# Patient Record
Sex: MALE | Race: WHITE
[De-identification: names, ages, dates, MRNs, and addresses within clinical notes are randomized per-mention and may not be internally consistent; named-entity substitution may affect disease eponyms.]

---

## 2020-04-03 ENCOUNTER — HOSPITAL ENCOUNTER (INPATIENT)
Dept: HOSPITAL 75 - IRF | Age: 60
LOS: 12 days | Discharge: HOME HEALTH | DRG: 560 | End: 2020-04-15
Attending: INTERNAL MEDICINE | Admitting: INTERNAL MEDICINE
Payer: COMMERCIAL

## 2020-04-03 VITALS — WEIGHT: 203.05 LBS | HEIGHT: 65.91 IN | BODY MASS INDEX: 33.02 KG/M2

## 2020-04-03 DIAGNOSIS — Z47.1: Primary | ICD-10-CM

## 2020-04-03 DIAGNOSIS — N31.9: ICD-10-CM

## 2020-04-03 DIAGNOSIS — M62.838: ICD-10-CM

## 2020-04-03 DIAGNOSIS — K59.8: ICD-10-CM

## 2020-04-03 DIAGNOSIS — Z96.651: ICD-10-CM

## 2020-04-03 DIAGNOSIS — R29.6: ICD-10-CM

## 2020-04-03 DIAGNOSIS — K59.09: ICD-10-CM

## 2020-04-03 DIAGNOSIS — S14.109S: ICD-10-CM

## 2020-04-03 DIAGNOSIS — Z86.718: ICD-10-CM

## 2020-04-03 DIAGNOSIS — M62.561: ICD-10-CM

## 2020-04-03 DIAGNOSIS — E87.1: ICD-10-CM

## 2020-04-03 DIAGNOSIS — K21.9: ICD-10-CM

## 2020-04-03 PROCEDURE — 85025 COMPLETE CBC W/AUTO DIFF WBC: CPT

## 2020-04-03 PROCEDURE — 80053 COMPREHEN METABOLIC PANEL: CPT

## 2020-04-03 PROCEDURE — 36415 COLL VENOUS BLD VENIPUNCTURE: CPT

## 2020-04-03 PROCEDURE — 94664 DEMO&/EVAL PT USE INHALER: CPT

## 2020-04-03 PROCEDURE — 83540 ASSAY OF IRON: CPT

## 2020-04-06 VITALS — DIASTOLIC BLOOD PRESSURE: 76 MMHG | SYSTOLIC BLOOD PRESSURE: 117 MMHG

## 2020-04-06 RX ADMIN — ASPIRIN 81 MG CHEWABLE TABLET SCH MG: 81 TABLET CHEWABLE at 21:23

## 2020-04-06 RX ADMIN — ENOXAPARIN SODIUM SCH MG: 100 INJECTION SUBCUTANEOUS at 18:41

## 2020-04-06 RX ADMIN — OXYCODONE HYDROCHLORIDE AND ACETAMINOPHEN PRN TAB: 5; 325 TABLET ORAL at 21:24

## 2020-04-06 RX ADMIN — BACLOFEN SCH MG: 10 TABLET ORAL at 18:41

## 2020-04-06 RX ADMIN — DOCUSATE SODIUM AND SENNOSIDES SCH EA: 8.6; 5 TABLET, FILM COATED ORAL at 21:23

## 2020-04-06 RX ADMIN — POLYETHYLENE GLYCOL (3350) SCH GM: 17 POWDER, FOR SOLUTION ORAL at 23:30

## 2020-04-06 RX ADMIN — GABAPENTIN SCH MG: 300 CAPSULE ORAL at 21:23

## 2020-04-06 NOTE — NUR
ENTERED THE MED REC USING THE DISCHARGE ORDERS FROM [facility].

ON THE DISCHARGE ORDERS IT JUST SHOWED NEW MEDS HE HAD STARTED AT [facility]- IT DIDNT MENTION 
ANY MEDS THEY WANTED HIM TO CONTINUE AFTER DISCHARGE (MAINTENANCE MEDS ETC)

SINCE THE PT WAS NOT HERE YET I CALLED THE PATIENT WIFE (FIDELIA) AND SHE LET ME KNOW HE 
DIDNT TAKE ANY MAINTENANCE MEDICATION TO HER KNOWLEDGE. 



AFTER THE MEDS ARE CONTINUED I WILL SPEAK WITH THE PT AND UPDATE THE MED REC AND NOTES AS 
NEEDED 

-------------------------------------------------------------------------------

Addendum: 04/07/20 at 1132 by JENNIFER ROSAS CPhT

-------------------------------------------------------------------------------

SPOKE WITH PT AND WENT THRU THE EXT MED HISTORY TO COMPLETE THE MED REC



ALL MEDICATIONS THAT WERE PREVIOUSLY LISTED ON THE MED REC ARE NEW FROM [facility] AND THEY ALL 
HAVE BEEN REMOVED FROM THE MED REC: PERCOCET 5/325, GABAPENTIN 300MG, BACLOFEN 10MG & 
ASPIRIN 81MG.



THE FOLLOWS MEDS HAVE BEEN ADDED TO THE MED REC DUE TO THE PT TAKING THEM BEFORE HIS [facility] 
STAY:

MELOXICAM 15MG- 1 TAB DAILY PRN

ALEVE 220MG- 1 TAB Q 8 H PRN 



PROTONIX 40MG IS SHOWN ON THE EXT MED HISTORY BUT THE PT SAYS HE QUIT TAKING
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
## 2020-04-06 NOTE — NUR
JONI AMES admitted to room 228-1, with an admitting diagnosis of REHAB FOR RIGHT TOTAL 
KNEE REPLACEMENT , on 04/06/20 from [facility] via W/C AND PRIVATE CAR, accompanied by 
STAFF.JONI AMES introduced to surroundings, call light, bed controls, phone, TV, 
temperature control, lights, meal times, smoking policy, visitor policy, side rail policy, 
bathrooms and showers.  Patient Rights given to patient in the handbook.JONI AMES 
verbalizes understanding that [facility] is not responsible for the loss or damage to any 
personal effects or valuables that are kept in the patients posession during their 
hospitalization.  The following Patient Care Plans were discussed with the PT: Discharge 
Planning, PAIN CONTROL,TESTS AND PROCEDURES, and PT/OT THERAPY. JONI AMES verbalizes 
understanding of Interdisciplinary Patient Education. Patient and/or family were informed 
about the Rapid Response Team and its purpose.

## 2020-04-07 VITALS — SYSTOLIC BLOOD PRESSURE: 106 MMHG | DIASTOLIC BLOOD PRESSURE: 71 MMHG

## 2020-04-07 VITALS — SYSTOLIC BLOOD PRESSURE: 110 MMHG | DIASTOLIC BLOOD PRESSURE: 72 MMHG

## 2020-04-07 VITALS — DIASTOLIC BLOOD PRESSURE: 75 MMHG | SYSTOLIC BLOOD PRESSURE: 119 MMHG

## 2020-04-07 LAB
ALBUMIN SERPL-MCNC: 3.3 GM/DL (ref 3.2–4.5)
ALP SERPL-CCNC: 43 U/L (ref 40–136)
ALT SERPL-CCNC: 33 U/L (ref 0–55)
BASOPHILS # BLD AUTO: 0 10^3/UL (ref 0–0.1)
BASOPHILS NFR BLD AUTO: 0 % (ref 0–10)
BILIRUB SERPL-MCNC: 0.5 MG/DL (ref 0.1–1)
BUN/CREAT SERPL: 21
CALCIUM SERPL-MCNC: 9.1 MG/DL (ref 8.5–10.1)
CHLORIDE SERPL-SCNC: 100 MMOL/L (ref 98–107)
CO2 SERPL-SCNC: 24 MMOL/L (ref 21–32)
CREAT SERPL-MCNC: 0.76 MG/DL (ref 0.6–1.3)
EOSINOPHIL # BLD AUTO: 0.1 10^3/UL (ref 0–0.3)
EOSINOPHIL NFR BLD AUTO: 1 % (ref 0–10)
ERYTHROCYTE [DISTWIDTH] IN BLOOD BY AUTOMATED COUNT: 12.5 % (ref 10–14.5)
GFR SERPLBLD BASED ON 1.73 SQ M-ARVRAT: > 60 ML/MIN
GLUCOSE SERPL-MCNC: 97 MG/DL (ref 70–105)
HCT VFR BLD CALC: 33 % (ref 40–54)
HGB BLD-MCNC: 11 G/DL (ref 13.3–17.7)
LYMPHOCYTES # BLD AUTO: 1.3 X 10^3 (ref 1–4)
LYMPHOCYTES NFR BLD AUTO: 14 % (ref 12–44)
MANUAL DIFFERENTIAL PERFORMED BLD QL: NO
MCH RBC QN AUTO: 33 PG (ref 25–34)
MCHC RBC AUTO-ENTMCNC: 33 G/DL (ref 32–36)
MCV RBC AUTO: 99 FL (ref 80–99)
MONOCYTES # BLD AUTO: 1.1 X 10^3 (ref 0–1)
MONOCYTES NFR BLD AUTO: 12 % (ref 0–12)
NEUTROPHILS # BLD AUTO: 6.9 X 10^3 (ref 1.8–7.8)
NEUTROPHILS NFR BLD AUTO: 74 % (ref 42–75)
PLATELET # BLD: 282 10^3/UL (ref 130–400)
PMV BLD AUTO: 9.8 FL (ref 7.4–10.4)
POTASSIUM SERPL-SCNC: 4.5 MMOL/L (ref 3.6–5)
PROT SERPL-MCNC: 6.6 GM/DL (ref 6.4–8.2)
SODIUM SERPL-SCNC: 135 MMOL/L (ref 135–145)
WBC # BLD AUTO: 9.3 10^3/UL (ref 4.3–11)

## 2020-04-07 RX ADMIN — OXYCODONE HYDROCHLORIDE AND ACETAMINOPHEN PRN TAB: 5; 325 TABLET ORAL at 08:41

## 2020-04-07 RX ADMIN — POLYETHYLENE GLYCOL (3350) SCH GM: 17 POWDER, FOR SOLUTION ORAL at 21:44

## 2020-04-07 RX ADMIN — GABAPENTIN SCH MG: 300 CAPSULE ORAL at 21:43

## 2020-04-07 RX ADMIN — DOCUSATE SODIUM AND SENNOSIDES SCH EA: 8.6; 5 TABLET, FILM COATED ORAL at 08:43

## 2020-04-07 RX ADMIN — ASPIRIN 81 MG CHEWABLE TABLET SCH MG: 81 TABLET CHEWABLE at 08:40

## 2020-04-07 RX ADMIN — ENOXAPARIN SODIUM SCH MG: 100 INJECTION SUBCUTANEOUS at 18:09

## 2020-04-07 RX ADMIN — POLYETHYLENE GLYCOL (3350) SCH GM: 17 POWDER, FOR SOLUTION ORAL at 08:42

## 2020-04-07 RX ADMIN — OXYCODONE HYDROCHLORIDE AND ACETAMINOPHEN PRN TAB: 5; 325 TABLET ORAL at 21:44

## 2020-04-07 RX ADMIN — BACLOFEN SCH MG: 10 TABLET ORAL at 21:43

## 2020-04-07 RX ADMIN — DOCUSATE SODIUM AND SENNOSIDES SCH EA: 8.6; 5 TABLET, FILM COATED ORAL at 08:42

## 2020-04-07 RX ADMIN — DOCUSATE SODIUM AND SENNOSIDES SCH EA: 8.6; 5 TABLET, FILM COATED ORAL at 21:43

## 2020-04-07 RX ADMIN — BACLOFEN SCH MG: 10 TABLET ORAL at 08:40

## 2020-04-07 RX ADMIN — ASPIRIN 81 MG CHEWABLE TABLET SCH MG: 81 TABLET CHEWABLE at 21:43

## 2020-04-07 RX ADMIN — OXYCODONE HYDROCHLORIDE AND ACETAMINOPHEN PRN TAB: 5; 325 TABLET ORAL at 15:07

## 2020-04-07 RX ADMIN — BACLOFEN SCH MG: 10 TABLET ORAL at 13:27
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
## 2020-04-07 NOTE — INDIVIDUALIZED PLAN OF CARE
Individualized Plan of Care


Rehab Nursing IPOC Order


Admission Date


Apr 6, 2020 at 16:51


Current Orders





Orders


Admission Order(Inpt,Obs,Sdc) (4/3/20 10:58)


Vital Signs: Per Unit Policy ( 08,16,00 (4/3/20 10:58)


Royce Hose 09,21 (4/3/20 10:58)


Sequential Compression Device Q4H (4/3/20 10:58)


-Inpt Rehab Con (4/3/20 10:58)


Rehab Nursing Orders-Ipoc (4/3/20 10:58)


Physical Therapy Rehab Orders (4/3/20 10:58)


Occupational Therapy Rehab Ord (4/3/20 10:58)


Speech Therapy Rehab Orders (4/3/20 10:58)


General/Regular (4/3/20 Dinner)


Precautions (Aru) (4/3/20 10:58)


Rehab-Intensity Of Therapy (4/3/20 10:58)


Initiate Admission Nursing Pro .admission (4/3/20 10:58)


Alprazolam Tablet (Xanax Tablet) (4/3/20 11:00)


Calcium Carbonate Chew Tablet (Antacid C (4/3/20 11:00)


Diphenhydramine Tablet (Benadryl Tablet) (4/3/20 11:00)


Docusate Sodium Capsule (Colace Capsule) (4/3/20 21:00)


Docusate Sodium Capsule (Colace Capsule) (4/3/20 11:00)


Bisacodyl Suppository (Dulcolax Supposit (4/3/20 11:00)


Lactulose Oral Solution (Enulose Oral So (4/3/20 11:00)


Na Phos/Na Biphos Enema (Fleet Enema Kumar (4/3/20 11:00)


Guaifenesin/Codeine Syrup (Robitussin Ac (4/3/20 11:00)


Loperamide Tablet (Imodium Tablet) (4/3/20 11:00)


Enoxaparin Injection (Lovenox Injection) (4/3/20 11:00)


Melatonin  Tablet (Melatonin Tablet) (4/3/20 11:00)


Polyethylene Glycol Powder Pkt (Miralax (4/3/20 21:00)


Ondansetron  Oral Dissolve Tab (Zofran (4/3/20 11:00)


Senna S Tablet (Senokot S Tablet) (4/3/20 21:00)


Code/Resuscitation (4/3/20 10:58)


Cbc With Automated Diff (4/7/20 06:00)


Comprehensive Metabolic Panel (4/7/20 06:00)


Iron Test (Fe) (4/7/20 06:00)


Aspirin Chewable Tablet (Baby Aspirin Ch (4/6/20 21:00)


Baclofen Tablet (Lioresal Tablet) (4/6/20 21:00)


Gabapentin Capsule/Tablet (Neurontin Cap (4/6/20 21:00)


Oxycodone/Apap 5/325mg Tablet (Percocet (4/6/20 17:45)


Polyethylene Glycol Powder Pkt (Miralax (4/6/20 21:00)


Senna S Tablet (Senokot S Tablet) (4/6/20 21:00)


Ambulate 08,12,20 (4/6/20 17:45)


Sequential Compression Device Q4H (4/6/20 17:45)


Dvt/Vte Risk - Notifiy Physici Q4H (4/6/20 17:45)


Enoxaparin Injection (Lovenox Injection) (4/6/20 18:00)


Rt Request For Service (4/6/20 19:23)


Patient Visit (4/7/20 )


Speech Sound Lang Comp (4/7/20 )


Patient Visit (4/7/20 )


Pt Eval Moderate Complexity (4/7/20 )


Functional Activities, Ea 15 (4/7/20 )


Exercise Therap, Ea 15 Min (4/7/20 )


Wheelchair Mgmt/Propulsn 15min (4/7/20 )


Diphenhydramine 2% Cream (Benadryl 2% Cr (4/7/20 17:30)


Patient Visit (4/8/20 )


Exercise Therap, Ea 15 Min (4/8/20 )


Gait Training, Ea 15 Min (4/8/20 )


Functional Activities, Ea 15 (4/8/20 )


Aspirin Chewable Tablet (Baby Aspirin Ch (4/9/20 09:00)


Patient Visit (4/8/20 )


Gait Training, Ea 15 Min (4/8/20 )


Wheelchair Mgmt/Propulsn 15min (4/8/20 )


Rehab Nursing Orders:  Ongoing Assess. of Cognitive Status, Ongoing Assess. of 

Function Status, Bladder Management, Bladder Scan, Bladder Training, Bowel 

Management, Bowel Training, Disease Management & Educaiton, DVT Prophylaxis, 

Fall Prevention, Fluid/Electrolyte/Nutrition Mgmt, Infection Prevention, 

Medication Management & Education, Management of Risks & Complications, 

Management of Skin Intergrity, Nutrition Management, Pain Management, 

Patient/Family Support, Safety Management, Swallow Precautions, Weight Bearing 

Precaution





Intensity of Therapy to be met


Patient to be seen:  Min.3h per day/5 of 7d





PT IPOC


Problem List:  Activity Tolerance, Functional Strength, Safety, Balance, Gait, 

Transfer, Bed Mobility, ROM


Treatment Plan:  Continue Plan of Care


Bed Mobility, Education, Functional Activity Kai, Functional Strength, Group 

Therapy, Gait, Safety, Therapeutic Exercise, Transfers


Treatment Duration:  Apr 28, 2020


Frequency:  At least 5 of 7 days/Wk (IRF)


Estimated Hrs Per Day:  1.5 hours per day





OT IPOC


Problems:  Decreased Activ Tolerance, Dependent Transfers, Impaired Bed 

Mobility, Impaired Funct Balance, Impaired I ADL's, Impaired Self-Care Skills


OT Treatment, Training and Edu:  Yes


Plan of Care:  ADL Retraining, Functional Mobility, Group Exercise/Act as Ind, 

UE Funct Exercise/Act


Treatment Duration:  Apr 21, 2020


Frequency:  At least 5 of 7 days/Wk (IRF)


Estimated Hrs Per Day:  1.5 hours per day





ST IPOC


Speech Therapy Treatment Plan:  Discontinue ST


Treatment Duration:  Apr 7, 2020


Frequency:  1 time per week


Estimated Hrs Per Day:  .25 hour per day





/Case Mgmt


/Case Managemen:  Discharge Planning





Dietitian/Nutritionist


Dietitian/Nutritionist to monitor nutritional status and make changes and/or 

recommendations as needed and work with speech pathology on dietary upgrades as 

the occur.





Physician IPOC


Medical Issues being managed closely and that require the 24 hour availability 

of a physician:





h/p spinal cord injury previous quad from C4-C5 fracture with h/o multiple falls

with neurogenic bladder and atonic colon at high risk for recurrent DVT and 

falls


Medical Issues:  Bowel/Bladder Function, DVT Prophylaxis, Falls Precautions, 

Fluid/Electrolyte/Nutrition Balance, Infection Protection, Pain Management


Brief Synthesis of Preadmission Screen, Post-Admission Evaluation, and Therapy 

Evaluations:





PT OT will focus on regaining ADL's and stamina and ambulation and gain enough 

strength to go home with family.


Medical Prognosis:  Good


Anticipated Length of Stay:  7 days











EYAL NIELSEN DO                 Apr 7, 2020 19:43

## 2020-04-07 NOTE — NUR
RD ASSESSMENT 



PMHx: GERD; chronic constipation; s/p R TKA 



PT INTERACTION: Pt was awake and pleasant during nutrition assessment. Pt states current 
appetite is okay, but it was a little worse prior to admit. Note PO intake of 100% x1meal, 
per chart review. Pt states following a regular diet at home, and has no issues with 
chewing/swallowing food. Pt states no recent issues with nausea or vomiting. Pt states 
having chronic issues with constipation, and that his last BM was 4/6. Note pt currently on 
bowel regimen of Senna BID; and Miralax BID, per chart review. Pt states no recent wt 
changes. Note unable to determine recent wt hx, per chart review. 



ABNORMAL NUTRITION-RELATED LAB VALUES

**Labs WNL at this time



Est. kcal needs: 8380-6452 kcal | 20-25 kcal/kg  

Est. Pro needs:   g Pro | 1.0-1.2 g Pro/kg 



PES STATEMENT: Given current PO intake, no nutrition diagnosis at this time (NO-1.1)



INTERVENTION:  

Continue with current diet order of Regular diet. 

Will continue to follow and reassess as pt needs, intake, and status change. 



MONITOR/EVALUATE:  

PO Intake; Plan of Care; Hydration Status; Weight Status; Lab Values 





RENETTA Dugan, MS, RD, LD

## 2020-04-07 NOTE — OCCUPATIONAL THERAPY EVAL
OT Evaluation-General/PLF


Medical Diagnosis


Admission Date


2020 at 16:51


Medical Diagnosis:  Right knee replacement


Onset Date:  Mar 31, 2020





Therapy Diagnosis


Therapy Diagnosis:  Weakness, Decreased ADL skills





Precautions


Precautions/Isolations:  Fall Prevention, Standard Precautions, Pressure Ulcer





Weight Bear Status


Weight Bearing Restriction:  Weight Bearing/Tolerated


Pt. has extension brace on right LE for mobility and pain.





Referral


Physician:  Ashley Vogel DO


Referral Reason:  Activity Tolerance, Self Care, Evaluation/Treatment, 

Strengthening/ROM





Medical History


Pertinent Medical History:  Back Injury


Additional Medical History


C4-5 fx with fusion, spinal cord injury, spasms, neurogenic bladder, DVT


Current History


Pt. reports that his knees were causing severe pain.  Elective knee replacement 

right LE.


Reviewed History:  Yes





Social History


Home:  Single Level


Current Living Status:  Spouse (And two grown children)


Entry Into Home:  Stairs With Railing


 Steps Into Home:  3





ADL-Prior Level of Function


SCALE: Activities may be completed with or without assistive devices.





6-Indepedent-patient completes the activity by him/herself with no assistance 

from a helper.


5-Set-up or Clean-up Assistance-helper sets up or cleans up; patient completes 

activity. [place] assists only prior to or  


    following the activity.


4-Supervision or Touching Assistance-helper provides verbal cues and/or 

touching/steadying and/or contact guard assistance as patient completes 

activity. Assistance may be provided   


    throughout the activity or intermittently.


3-Partial/Moderate Assistance-helper does LESS THAN HALF the effort. [place] 

lifts, holds or supports trunk or limbs, but provides less than half the effort.


2-Substantial/Maximal Assistance-helper does MORE THAN HALF the effort. [place] 

lifts or holds trunk or limbs and provides more than half the effort.


1-Gscdspuct-ijbamd does ALL the effort. Patient does none of the effort to 

complete the activity. Or, the assistance of 2 or more helpers is required for 

the patient to complete the  


    activity.


If activity was not attempted, code reason:


7-Patient Refused.


9-Not Applicable-not attempted and the patient did not perform the activity befo

re the current illness, exacerbation or injury.


10-Not Attempted due to Environmental Limitations-(lack of equipment, weather re

straints, etc.).


88-Not Attempted due to Medical Conditions or Safety Concerns.


ADL PLOF Comments


Pt. reports that he was not using assistive device for ambulation until 

recently.  Pt. reports a small step up into shower, and a jacuzzi tub that he 

gets into, and "crawls" out of.  Pt. has modified certain tasks due to previous 

SCI.  He is continent of bowel and bladder, as long as he is able to reach 

toilet on time.  He was independent with daily skills such as 

dressing/bathing/driving.


Self Care:  Independent


Functional Cognition:  Independent


DME/Equipment:  Shower, Tub/Shower


DME/Equipment Comments


Pt. has a walker and cane, and a borrowed wheelchair.


Occupation:   at [employer]


Drive Self:  Yes





OT Current Status


Subjective


Pt. reports 6/10 pain in right knee.  Nursing gives pain medication.





Appearance


Pt. in bed when OT enters room.  Agrees to work with therapist.





Mental Status/Objective


Patient Orientation:  Person, Place, Time, Situation





Current


Glasses/Contacts:  Yes


Hand Dominance:  Right


Upper Extremity ROM


WFL


Upper Extremity Coordination


Limited coordination in right hand due to SCI in .  Pt. reports that he is 

able to use index and middle finger for typing.


Upper Extremity Sensation


Intact





ADL-Treatment


Eating (QC):  6


Oral Hygiene (QC):  5 (Set up)


Shower/Bathe Self (QC):  3 (Min assist for sponge bath only.  Will complete 

shower at next session.)


Upper Body Dressing (QC):  5


Lower Body Dressing (QC):  5 (Pt. removed shorts and donned them at bed level 

previous to OT entering room. )


On/Off Footwear (QC):  2


Toileting Hygiene (QC):  7


Pt. seen twice by OT for evaluation and treatment.  Pt. is educated on rehab 

goals and OT goals.  Verbalizes understanding.  Pt. reports that he doffed 

shorts in bed at night because he was hot.  He donned them earlier before 

therapist arrived.  At second session in a.m., pt up in wheelchair after PT.  

Completed sponge bath in chair, with exception of washing rosana area, as he had 

already completed.  OT brought in AE after session and practiced doffing/donning

slipper socks with DS and sock aide.  Pt. able to do so with SBA and cues.  Pt. 

able to self propel wheelchair to large bathroom.  OT demonstrated use of tub 

transfer bench and grab bars.  Pt. is concerned that bench will be too low for 

him.  Has brace on at this time that will make transfer into shower difficult, 

so will practice at later time when brace is able to come off during functional 

treatment.  Pt. and OT talked about his toilet at home.  He states that when he 

was having difficulty standing, he would slide to and from toilet.  Pt. 

verbalizes having a system at home that works for him.  OT will attempt to 

simulate it for increased independence at this facility.  Pt. self propelled 

back to room.  Stood from wheelchair with max assist and walker, and pivoted to 

bed.  Min assist to get right LE into bed.  All needs met.





Education


OT Patient Education:  Correct positioning, Modified ADL techniques, Progress 

toward Goal/Update tx plan, Purpose of tx/functional activities, Reviewed p

recautions, Rehab process, Transfer techniques, Use of adapted equipment, W/C 

management


Teaching Recipient:  Patient


Teaching Methods:  Demonstration, Discussion


Response to Teaching:  Verbalize Understanding, Return Demonstration





OT Short Term Goals


Short Term Goals


Time Frame:  2020


Eatin


Oral hygiene:  6


Toileting hygiene:  4


Shower/bathe self:  4


Upper body dressin


Lower body dressing:  3


Putting on/taking off footwear:  3





OT Long Term Goals


Long Term Goals


Time Frame:  2020


Eating (QC):  6


Oral Hygiene (QC):  6


Toileting Hygiene (QC):  6


Shower/Bathe Self (QC):  5


Upper Body Dressing (QC):  6


Lower Body Dressing (QC):  6


On/Off Footwear (QC):  6


Additional Goals:  1-Demonstrate ADL Tasks, 2-Verbalize Understanding, 3-

ImproveStrength/Kai


1=Demonstrate adherence to instructed precautions during ADL tasks.


2=Patient will verbalize/demonstrate understanding of assistive 

devices/modifications for ADL.


3=Patient will improve strength/tolerance for activity to enable patient to 

perform ADL's.





OT Education/Plan


Problem List/Assessment


Assessment:  Decreased Activ Tolerance, Dependent Transfers, Impaired Bed 

Mobility, Impaired Funct Balance, Impaired I ADL's, Impaired Self-Care Skills





Discharge Recommendations


Plan/Recommendations:  Continue POC


Therapy Discharge Recommendati:  Home & Family, Post Acute OT


Equpiment Recommendations-D/C:  Extended Bath Bench, Rails on Tub/Shower, Hip 

Kit


Target Placement


Home with family support.





Treatment Plan/Plan of Care


Treatment,Training & Education:  Yes


Patient would benefit from OT for education, treatment and training to promote 

independence in ADL's, mobility, safety and/or upper extremity function for 

ADL's.


Plan of Care:  ADL Retraining, Functional Mobility, Group Exercise/Act as Ind, 

UE Funct Exercise/Act


Treatment Duration:  2020


Frequency:  At least 5 of 7 days/Wk (IRF)


Estimated Hrs Per Day:  1.5 hours per day


Agreement:  Yes


Rehab Potential:  Good





Time/GCodes


Start Time:  08:25


Stop Time:  11:55


Total Time Billed (hr/min):  90


Billed Treatment Time


7877-9390 1, EVM x 15minutes, FA x 20minutes


7781-8294 1, ADL x 55minutes











LILIANA SIMPSON OT            2020 14:22

## 2020-04-07 NOTE — PHYSICAL THERAPY DAILY NOTE
PT Daily Note-Current


Subjective


Patient in bed pre tx, agrees to PT, has 6-7/10 pain, states he will take pain 

meds when done with PT.





Appearance


Patient in recliner post tx with nurse call, phone, tray, all needs met.





Mental Status


Patient Orientation:  Normal For Age





Transfers


SCALE: Activities may be completed with or without assistive devices.





6-Indepedent-patient completes the activity by him/herself with no assistance 

from a helper.


5-Set-up or Clean-up Assistance-helper sets up or cleans up; patient completes 

activity. [place] assists only prior to or  


    following the activity.


4-Supervision or Touching Assistance-helper provides verbal cues and/or 

touching/steadying and/or contact guard assistance as patient completes 

activity. Assistance may be provided   


    throughout the activity or intermittently.


3-Partial/Moderate Assistance-helper does LESS THAN HALF the effort. [place] 

lifts, holds or supports trunk or limbs, but provides less than half the effort.


2-Substantial/Maximal Assistance-helper does MORE THAN HALF the effort. [place] 

lifts or holds trunk or limbs and provides more than half the effort.


5-Aqkikfyxe-zinacl does ALL the effort. Patient does none of the effort to 

complete the activity. Or, the assistance of 2 or more helpers is required for 

the patient to complete the  


    activity.


If activity was not attempted, code reason:


7-Patient Refused.


9-Not Applicable-not attempted and the patient did not perform the activity 

before the current illness, exacerbation or injury.


10-Not Attempted due to Environmental Limitations-(lack of equipment, weather 

restraints, etc.).


88-Not Attempted due to Medical Conditions or Safety Concerns.


Roll Left & Right (QC):  3


Lying to Sitting/Side of Bed(Q:  3


Sit to Stand (QC):  3


Chair/Bed-to-Chair Xfer(QC):  3





Weight Bearing


Right Lower Extremity:  Right


Weight Bearing/Tolerated





Gait Training


Distance:  30'


Walk 10 feet (QC):  4


Gait Persons Needed:  1


Gait Assistive Device:  FWW


CGA, slides both feet across the floor, very slow, narrow ANT





Wheelchair Training


Does the Pt Use a Wheelchair?:  Yes


Wheel 50 ft with 2 turns (QC):  4


Wheel 150 ft (QC):  4


Type of Wheelchair:  Manual


150'x2





Exercises


Standing:  Hip Abduction, Heel/toe raises, 3 way Ex=Flex, Abd, Ext (only 

flexion)


Standing Reps:  10





Treatments


bed mobility and transfers, ambulation, LE exercise, WC mobility





Assessment


Current Status:  Fair Progress


improved sit to stand, still mod assist but slightly less than this morning





PT Short Term Goals


Short Term Goals


Time Frame:  2020


Roll Left & Right:  4


Sit to lyin


Lying to sitting on side of be:  3


Sit to stand:  3


Chair/bed-to-chair transfer:  4


Walk 10 feet:  4


Walk 50 feet with two turns:  4





PT Long Term Goals


Long Term Goals


PT Long Term Goals Time Frame:  2020


Roll Left & Right (QC):  4 (SBA)


Sit to Lying (QC):  4 (SBA)


Lying-Sitting on Side/Bed(QC):  4 (SBA)


Sit to Stand (QC):  3 (Era)


Chair/Bed-to-Chair Xfer(QC):  4 (SBA)


Toilet Transfer (QC):  3 (Era)


Car Transfer (QC):  3 (Era)


Does the Patient Walk:  Yes


Walk 10 feet (QC):  4 (SBA)


Walk 50ft with 2 Turns (QC):  4 (SBA)


Walk 150 ft (QC):  4 (SBA)


Walking 10ft on Uneven Surface:  88


1 Step (curb) (QC):  88


4 Steps (QC):  88


12 Steps (QC):  88


Picking up an Object (QC):  88


Does the Pt use WC or Scooter?:  Yes


Wheel 50 feet with 2 turns (QC:  6


Type:  Manual


Wheel 150 feet:  6


Type:  Manual





PT Plan


Problem List


Problem List:  Activity Tolerance, Functional Strength, Safety, Balance, Gait, 

Transfer, Bed Mobility, ROM





Treatment/Plan


Treatment Plan:  Continue Plan of Care


Treatment Plan:  Bed Mobility, Education, Functional Activity Kai, Functional 

Strength, Group Therapy, Gait, Safety, Therapeutic Exercise, Transfers


Treatment Duration:  2020


Frequency:  At least 5 of 7 days/Wk (IRF)


Estimated Hrs Per Day:  1.5 hours per day


Patient and/or Family Agrees t:  Yes





Safety Risks/Education


Patient Education:  Gait Training, Transfer Techniques, Correct Positioning, W/C

Management, Safety Issues


Teaching Recipient:  Patient


Teaching Methods:  Demonstration, Discussion


Response to Teaching:  Reinforcement Needed





Time/GCodes


Time In:  1330


Time Out:  1400


Total Billed Treatment Time:  30


Total Billed Treatment


1 visit


FA 20'


[facility] 10'











NAVJOT STEVENS PT                 2020 14:02

## 2020-04-07 NOTE — NUR
Pt reported some itchiness on his upper back, states that he noticed it a few days before he 
left [place]. Noted scattered, red, pimply like areas, notified Dr. Vogel, & rec'd new 
orders for Benadryl cream, see EMAR.

## 2020-04-07 NOTE — PHYSICAL THERAPY EVALUATION
PT Evaluation-General


Medical Diagnosis


Admission Date


2020 at 16:51


Medical Diagnosis:  Right knee replacement


Onset Date:  Mar 31, 2020





Therapy Diagnosis


Therapy Diagnosis:  impaired mobility, strength, endurance, balance, ROM





Precautions


Precautions/Isolations:  Fall Prevention, Standard Precautions, Pressure Ulcer





Weight Bear Status


Right Lower Extremity:  Right


Weight Bearing/Tolerated





Referral


Physician:  Ashley Vogel DO


Reason for Referral:  Evaluation/Treatment





Medical History


Pertinent Medical History:  Back Injury


Additional Medical History


C4-5 fx with fusion, spinal cord injury, spasms, neurogenic bladder, DVT


Reviewed History:  Yes





Social History


Home:  Single Level


Current Living Status:  Spouse (grown children at home)


Entry Into Home:  Stairs With Railing


PT Steps Into Home:  3





Prior


Prior Level of Function


SCALE: Activities may be completed with or without assistive devices.





6-Indepedent-patient completes the activity by him/herself with no assistance 

from a helper.


5-Set-up or Clean-up Assistance-helper sets up or cleans up; patient completes 

activity. [place] assists only prior to or  


    following the activity.


4-Supervision or Touching Assistance-helper provides verbal cues and/or 

touching/steadying and/or contact guard assistance as patient completes 

activity. Assistance may be provided   


    throughout the activity or intermittently.


3-Partial/Moderate Assistance-helper does LESS THAN HALF the effort. [place] 

lifts, holds or supports trunk or limbs, but provides less than half the effort.


2-Substantial/Maximal Assistance-helper does MORE THAN HALF the effort. [place] 

lifts or holds trunk or limbs and provides more than half the effort.


7-Gpiewojzp-kmvfyh does ALL the effort. Patient does none of the effort to 

complete the activity. Or, the assistance of 2 or more helpers is required for 

the patient to complete the  


    activity.


If activity was not attempted, code reason:


7-Patient Refused.


9-Not Applicable-not attempted and the patient did not perform the activity 

before the current illness, exacerbation or injury.


10-Not Attempted due to Environmental Limitations-(lack of equipment, weather 

restraints, etc.).


88-Not Attempted due to Medical Conditions or Safety Concerns.


Bed Mobility:  6


Transfers (B,C,W/C):  6


Gait:  6


Indoor Mobility (Ambulation):  Independent


Patient states he was using a SPC previously.





PT Evaluation-Current


Subjective


Patient in bed pre tx, agrees to PT, has 5/10 pain in right knee and hamstring. 

Patient has been having significant painful hamstring spasms.





Pt/Family Goals


to be independent at home





Objective


Patient Orientation:  Person, Place, Situation


right knee immobilizer for comfort and to control hamstring spasms.





ROM/Strength


ROM Lower Extremities


right knee flexion 80 degrees, extension +20 degrees





Sensory


Vision:  Wears Glasses


Hearing:  Functional


Sensation Right Lower Extremit:  Intact


Sensation Left Lower Extremity:  Intact





Transfers


Roll Left to Right (QC):  3


Sit to Lying (QC):  3


Lying to Sitting/Side of Bed(Q:  3


Sit to Stand (QC):  2


Chair/Bed-to-Chair Xfer(QC):  4


Toilet Transfer (QC):  2


Car Transfer (QC):  2


Patient performs bed mobility with min assist, supine to sit with mod assist, 

sit to supine with min assist, sit to stand max assist, transfers CGA, car 

transfer max assist.  Patient can perform sit to stand with CGA or min assist 

from a significantly elevated surface, but cannot help much from lower surfaces.





Gait


Does the Patient Walk?:  Yes


Mode of Locomotion:  Walk


Anticipated Mode of Locomotion:  Walk


Walk 10 feet (QC):  4


Walk 50 ft with 2 Turns(QC):  88


Walk 150 ft (QC):  88


Walking 10ft/uneven surface-QC:  88


Distance:  30'


Gait Assistive Device:  FWW


Comments/Gait Description


Patient ambulated 30' with a rolling walker with CGA.  He slides both feet 

across the floor, has right foot drop, bilateral flexed knees, very narrow ANT.





Wheelchair Training


Does the Pt Use a Wheelchair?:  Yes


Distance:  100'


Wheel 50 ft with 2 turns (QC):  4


Wheel 150 ft (QC):  88


Type of Wheelchair:  Manual





Stairs


1 Step (curb) (QC):  88


4 Steps (QC):  88


12 Steps (QC):  88





Balance


Sitting Static:  Fair


Sitting Dynamic:  Fair


Standing Static:  Poor


 Standing Dynamic:  Poor


Picking up an Object (QC):  88


Special Test Comments


Patient is a little light headed immediately upon sitting from laying down but 

it passes quickly, he is slightly unsteady just sitting at the edge of bed and 

needs come careful adjusting and positioning to get into a stable position.





Treatment


RLE TKA protocol (with brace off) x10 (QS, HS, SAQ, SLR), also performed RLE LAQ

for 5 min





Assessment/Needs


Patient has impaired mobility, strength, endurance, balance, ROM.   He has 

impaired sitting and standing balance and needs extra positioning to maintain 

safety.  Patient in WC at bedside post tx, has OT right after PT, has nurse 

call.


Rehab Potential:  Fair





PT Short Term Goals


Short Term Goals


Time Frame:  2020


Roll Left & Right:  4


Sit to lyin


Lying to sitting on side of be:  3


Sit to stand:  3


Chair/bed-to-chair transfer:  4


Walk 10 feet:  4


Walk 50 feet with two turns:  4





PT Long Term Goals


Long Term Goals


PT Long Term Goals Time Frame:  2020


Roll Left & Right (QC):  4 (SBA)


Sit to Lying (QC):  4 (SBA)


Lying-Sitting on Side/Bed(QC):  4 (SBA)


Sit to Stand (QC):  3 (Era)


Chair/Bed-to-Chair Xfer(QC):  4 (SBA)


Toilet Transfer (QC):  3 (Era)


Car Transfer (QC):  3 (Era)


Does the Patient Walk:  Yes


Walk 10 feet (QC):  4 (SBA)


Walk 50ft with 2 Turns (QC):  4 (SBA)


Walk 150 ft (QC):  4 (SBA)


Walking 10ft on Uneven Surface:  88


1 Step (curb) (QC):  88


4 Steps (QC):  88


12 Steps (QC):  88


Picking up an Object (QC):  88


Does the Pt use WC or Scooter?:  Yes


Wheel 50 feet with 2 turns (QC:  6


Type:  Manual


Wheel 150 feet:  6


Type:  Manual





PT Plan


Problem List


Problem List:  Activity Tolerance, Functional Strength, Safety, Balance, Gait, 

Transfer, Bed Mobility, ROM





Treatment/Plan


Treatment Plan:  Continue Plan of Care


Treatment Plan:  Bed Mobility, Education, Functional Activity Kai, Functional 

Strength, Group Therapy, Gait, Safety, Therapeutic Exercise, Transfers


Treatment Duration:  2020


Frequency:  At least 5 of 7 days/Wk (IRF)


Estimated Hrs Per Day:  1.5 hours per day


Patient and/or Family Agrees t:  Yes





Safety Risks/Education


Patient Education:  Gait Training, Transfer Techniques, Correct Positioning, W/C

Management, Safety Issues


Teaching Recipient:  Patient


Teaching Methods:  Demonstration, Discussion


Response to Teaching:  Reinforcement Needed





Discharge Recommendations


Plan


Patient will perform bed mobility and transfer training, balance and endurance 

training, functional strengthening, stair training, gait training, and 

education, to improve functional mobility and independence at home.


Therapy Discharge Recommendati:  Home & Family





Time/GCodes


Time In:  0900


Time Out:  1000


Total Billed Treatment Time:  60


Total Billed Treatment


1 visit


EVM 30'


FA 15'


EX 15'











NAVJOT STEVENS PT                 2020 11:34

## 2020-04-07 NOTE — PM&R PROGRESS NOTE
Subjective


HPI/CC On Admission


Date Seen by Provider:  Apr 7, 2020


Time Seen by Provider:  10:30


Subjective/Events-last exam


Patient doing better since bed has been changed out


Labs reviewed and all stable


DVT PPX with ASA and Lovenox since h/o DVT 1990


IT will set up his computer to help him keep up with work


Percocet and Baclofen working very well for him


Suppository available if needed since he does use those at home also


BM 4/6/20 before transported to [facility]


Checked meds and labs


Reviewed therapy notes


Conferred with RN





Review of Systems


General:  Fatigue


Musculoskeletal:  leg pain





Objective


Exam


Vital Signs





Vital Signs








  Date Time  Temp Pulse Resp B/P (MAP) Pulse Ox O2 Delivery O2 Flow Rate FiO2


 


4/7/20 16:53 36.8 79 16 106/71 (83) 96 Room Air  





Capillary Refill : Less Than 3 Seconds


General Appearance:  No Apparent Distress, WD/WN


HEENT:  PERRL/EOMI, Normal ENT Inspection, Pharynx Normal


Neck:  Full Range of Motion, Normal Inspection, Non Tender, Supple, Carotid 

Bruit


Respiratory:  Chest Non Tender, Lungs Clear, Normal Breath Sounds, No Accessory 

Muscle Use, No Respiratory Distress


Cardiovascular:  Regular Rate, Rhythm, No Edema, No Gallop, No JVD, No Murmur, 

Normal Peripheral Pulses


Gastrointestinal:  Normal Bowel Sounds, No Organomegaly, No Pulsatile Mass, Non 

Tender, Soft


Back:  Normal Inspection, No CVA Tenderness, No Vertebral Tenderness, Decreased 

Range of Motion


Extremity:  Normal Capillary Refill, Normal Inspection, Normal Range of Motion 

(except right leg), Non Tender, No Calf Tenderness, No Pedal Edema


Neurologic/Psychiatric:  Alert, Oriented x3, Normal Mood/Affect, Motor Weakness 

(all extremities 4/5, muscle wasting upper extremities)


Skin:  Normal Color, Warm/Dry


Lymphatic:  No Adenopathy





Results/Procedures


Lab


Laboratory Tests


4/7/20 05:20








Patient resulted labs reviewed.





FIM


Transfers


Therapy Code Descriptions/Definitions 





Functional [place] Measure:


0=Not Assessed/NA        4=Minimal Assistance


1=Total Assistance        5=Supervision or Setup


2=Maximal Assistance  6=Modified [place]


3=Moderate Assistance 7=Complete IndependenceSCALE: Activities may be completed 

with or without assistive devices.





6-Indepedent-patient completes the activity by him/herself with no assistance 

from a helper.


5-Set-up or Clean-up Assistance-helper sets up or cleans up; patient completes 

activity. [place] assists only prior to or  


    following the activity.


4-Supervision or Touching Assistance-helper provides verbal cues and/or 

touching/steadying and/or contact guard assistance as patient completes 

activity. Assistance may be provided   


    throughout the activity or intermittently.


3-Partial/Moderate Assistance-helper does LESS THAN HALF the effort. [place] 

lifts, holds or supports trunk or limbs, but provides less than half the effort.


2-Substantial/Maximal Assistance-helper does MORE THAN HALF the effort. [place] 

lifts or holds trunk or limbs and provides more than half the effort.


8-Jqnaakhxg-pmiypd does ALL the effort. Patient does none of the effort to compl

ete the activity. Or, the assistance of 2 or more helpers is required for the 

patient to complete the  


    activity.


If activity was not attempted, code reason:


7-Patient Refused.


9-Not Applicable-not attempted and the patient did not perform the activity 

before the current illness, exacerbation or injury.


10-Not Attempted due to Environmental Limitations-(lack of equipment, weather 

restraints, etc.).


88-Not Attempted due to Medical Conditions or Safety Concerns.





Assessment/Plan


Assessment and Plan


Assess & Plan/Chief Complaint


Assessment:


s/p right total knee replacement POD # 7


h/o DVT


Spinal cord injury hx C4-C6


Muscle spasms severe and incapacitating acute on chronic


Neurogenic bladder


Colon atony


GERD


Acute hyponatremia


DVT PPx with ASA and Lovenox





Plan:


Lovenox


DC Fluid restriction sodium 135


Pain control


IRF protocol


Bowel and bladder management





(1) Status post right knee replacement


(2) History of spinal cord injury


(3) Muscle spasm


(4) Neurogenic bladder


(5) Colon atonic


(6) Muscle wasting


(7) Hyponatremia


(8) History of DVT (deep vein thrombosis)


(9) DVT prophylaxis


(10) Chronic GERD











EYAL NIELSEN DO                 Apr 7, 2020 11:28

## 2020-04-08 VITALS — DIASTOLIC BLOOD PRESSURE: 79 MMHG | SYSTOLIC BLOOD PRESSURE: 122 MMHG

## 2020-04-08 VITALS — SYSTOLIC BLOOD PRESSURE: 120 MMHG | DIASTOLIC BLOOD PRESSURE: 73 MMHG

## 2020-04-08 RX ADMIN — DOCUSATE SODIUM AND SENNOSIDES SCH EA: 8.6; 5 TABLET, FILM COATED ORAL at 09:22

## 2020-04-08 RX ADMIN — BACLOFEN SCH MG: 10 TABLET ORAL at 14:13

## 2020-04-08 RX ADMIN — POLYETHYLENE GLYCOL (3350) SCH GM: 17 POWDER, FOR SOLUTION ORAL at 09:22

## 2020-04-08 RX ADMIN — POLYETHYLENE GLYCOL (3350) SCH GM: 17 POWDER, FOR SOLUTION ORAL at 20:13

## 2020-04-08 RX ADMIN — GABAPENTIN SCH MG: 300 CAPSULE ORAL at 20:13

## 2020-04-08 RX ADMIN — BACLOFEN SCH MG: 10 TABLET ORAL at 09:22

## 2020-04-08 RX ADMIN — BISACODYL PRN MG: 10 SUPPOSITORY RECTAL at 17:49

## 2020-04-08 RX ADMIN — OXYCODONE HYDROCHLORIDE AND ACETAMINOPHEN PRN TAB: 5; 325 TABLET ORAL at 18:56

## 2020-04-08 RX ADMIN — BACLOFEN SCH MG: 10 TABLET ORAL at 20:13

## 2020-04-08 RX ADMIN — ASPIRIN 81 MG CHEWABLE TABLET SCH MG: 81 TABLET CHEWABLE at 09:22

## 2020-04-08 RX ADMIN — DOCUSATE SODIUM AND SENNOSIDES SCH EA: 8.6; 5 TABLET, FILM COATED ORAL at 20:12

## 2020-04-08 RX ADMIN — ENOXAPARIN SODIUM SCH MG: 100 INJECTION SUBCUTANEOUS at 17:54

## 2020-04-08 RX ADMIN — OXYCODONE HYDROCHLORIDE AND ACETAMINOPHEN PRN TAB: 5; 325 TABLET ORAL at 10:30

## 2020-04-08 NOTE — PHYSICAL THERAPY DAILY NOTE
PT Daily Note-Current


Subjective


Patient in recliner pre tx, agrees to PT, voices no complaints of pain but 

states he is very tired.





Appearance


Patient in bed post tx with nurse call, phone, tray, all needs met.





Mental Status


Patient Orientation:  Person, Place, Situation





Transfers


SCALE: Activities may be completed with or without assistive devices.





6-Indepedent-patient completes the activity by him/herself with no assistance 

from a helper.


5-Set-up or Clean-up Assistance-helper sets up or cleans up; patient completes 

activity. [place] assists only prior to or  


    following the activity.


4-Supervision or Touching Assistance-helper provides verbal cues and/or 

touching/steadying and/or contact guard assistance as patient completes 

activity. Assistance may be provided   


    throughout the activity or intermittently.


3-Partial/Moderate Assistance-helper does LESS THAN HALF the effort. [place] 

lifts, holds or supports trunk or limbs, but provides less than half the effort.


2-Substantial/Maximal Assistance-helper does MORE THAN HALF the effort. [place] 

lifts or holds trunk or limbs and provides more than half the effort.


6-Wtvujvjmm-afrjcp does ALL the effort. Patient does none of the effort to 

complete the activity. Or, the assistance of 2 or more helpers is required for 

the patient to complete the  


    activity.


If activity was not attempted, code reason:


7-Patient Refused.


9-Not Applicable-not attempted and the patient did not perform the activity 

before the current illness, exacerbation or injury.


10-Not Attempted due to Environmental Limitations-(lack of equipment, weather 

restraints, etc.).


88-Not Attempted due to Medical Conditions or Safety Concerns.


Roll Left & Right (QC):  4


Sit to Lying (QC):  3


Sit to Stand (QC):  3


Chair/Bed-to-Chair Xfer(QC):  3





Weight Bearing


Right Lower Extremity:  Right


Weight Bearing/Tolerated





Gait Training


Distance:  20'x2


Walk 10 feet (QC):  4


Gait Persons Needed:  1


Gait Assistive Device:  FWW


patient did not want to ambulate another 20' due to fatigue





Wheelchair Training


Does the Pt Use a Wheelchair?:  Yes


Wheel 50 ft with 2 turns (QC):  5


Wheel 150 ft (QC):  5


Type of Wheelchair:  Manual


150'x2





Treatments


bed mobility and transfers, ambulation, WC mobiltiy





Assessment


Current Status:  Fair Progress


slowly improving ambulation





PT Short Term Goals


Short Term Goals


Time Frame:  2020


Roll Left & Right:  4


Sit to lyin


Lying to sitting on side of be:  3


Sit to stand:  3


Chair/bed-to-chair transfer:  4


Walk 10 feet:  4


Walk 50 feet with two turns:  4





PT Long Term Goals


Long Term Goals


PT Long Term Goals Time Frame:  2020


Roll Left & Right (QC):  4 (SBA)


Sit to Lying (QC):  4 (SBA)


Lying-Sitting on Side/Bed(QC):  4 (SBA)


Sit to Stand (QC):  3 (Era)


Chair/Bed-to-Chair Xfer(QC):  4 (SBA)


Toilet Transfer (QC):  3 (Era)


Car Transfer (QC):  3 (Era)


Does the Patient Walk:  Yes


Walk 10 feet (QC):  4 (SBA)


Walk 50ft with 2 Turns (QC):  4 (SBA)


Walk 150 ft (QC):  4 (SBA)


Walking 10ft on Uneven Surface:  88


1 Step (curb) (QC):  88


4 Steps (QC):  88


12 Steps (QC):  88


Picking up an Object (QC):  88


Does the Pt use WC or Scooter?:  Yes


Wheel 50 feet with 2 turns (QC:  6


Type:  Manual


Wheel 150 feet:  6


Type:  Manual





PT Plan


Problem List


Problem List:  Activity Tolerance, Functional Strength, Safety, Balance, Gait, 

Transfer, Bed Mobility, ROM





Treatment/Plan


Treatment Plan:  Continue Plan of Care


Treatment Plan:  Bed Mobility, Education, Functional Activity Kai, Functional 

Strength, Group Therapy, Gait, Safety, Therapeutic Exercise, Transfers


Treatment Duration:  2020


Frequency:  At least 5 of 7 days/Wk (IRF)


Estimated Hrs Per Day:  1.5 hours per day


Patient and/or Family Agrees t:  Yes





Safety Risks/Education


Patient Education:  Gait Training, Transfer Techniques, Correct Positioning, W/C

Management, Safety Issues


Teaching Recipient:  Patient


Teaching Methods:  Demonstration, Discussion


Response to Teaching:  Reinforcement Needed





Time/GCodes


Time In:  1300


Time Out:  1330


Total Billed Treatment Time:  30


Total Billed Treatment


1 visit


[facility] 10'


GT 20'











NAVJOT STEVENS PT                 2020 13:26

## 2020-04-08 NOTE — PHYSICAL THERAPY DAILY NOTE
PT Daily Note-Current


Subjective


/Patient in bed pre tx, agrees to PT, has 3/10 pain in right knee.





Appearance


Patient in recliner post tx with nurse call, phone, tray, all needs met.





Mental Status


Patient Orientation:  Person, Place, Situation, Normal For Age





Transfers


SCALE: Activities may be completed with or without assistive devices.





6-Indepedent-patient completes the activity by him/herself with no assistance 

from a helper.


5-Set-up or Clean-up Assistance-helper sets up or cleans up; patient completes 

activity. [place] assists only prior to or  


    following the activity.


4-Supervision or Touching Assistance-helper provides verbal cues and/or 

touching/steadying and/or contact guard assistance as patient completes 

activity. Assistance may be provided   


    throughout the activity or intermittently.


3-Partial/Moderate Assistance-helper does LESS THAN HALF the effort. [place] 

lifts, holds or supports trunk or limbs, but provides less than half the effort.


2-Substantial/Maximal Assistance-helper does MORE THAN HALF the effort. [place] 

lifts or holds trunk or limbs and provides more than half the effort.


9-Grwxgfitw-pwhidd does ALL the effort. Patient does none of the effort to 

complete the activity. Or, the assistance of 2 or more helpers is required for 

the patient to complete the  


    activity.


If activity was not attempted, code reason:


7-Patient Refused.


9-Not Applicable-not attempted and the patient did not perform the activity 

before the current illness, exacerbation or injury.


10-Not Attempted due to Environmental Limitations-(lack of equipment, weather 

restraints, etc.).


88-Not Attempted due to Medical Conditions or Safety Concerns.


Roll Left & Right (QC):  4


Sit to Lying (QC):  3


Lying to Sitting/Side of Bed(Q:  3


Sit to Stand (QC):  3


Chair/Bed-to-Chair Xfer(QC):  4





Weight Bearing


Right Lower Extremity:  Right


Weight Bearing/Tolerated





Gait Training


Distance:  30', 20'x2


Walk 10 feet (QC):  4


Gait Persons Needed:  1


Gait Assistive Device:  FWW


CGA, poor heel strike on the right side, very slow, slides feet across the 

floor, no foot clearance, narrow ANT





Wheelchair Training


Does the Pt Use a Wheelchair?:  Yes


Wheel 50 ft with 2 turns (QC):  6


Wheel 150 ft (QC):  6


Type of Wheelchair:  Manual





Exercises


Supine Ex:  Quad Set, Heel Slides, Short Arc Quads, Straight leg raise


Supine Reps:  10


5 min knee extension stretch


NuStep Minutes:  15


 NuStep Workload:  3





Treatments


bed mobility and transfers, ambulation, WC mobility, LE exercise and stretching





Assessment


Current Status:  Fair Progress


improving ambulation, patient had some hamstring spasms but was able to ambulate

without using the knee extension brace





PT Short Term Goals


Short Term Goals


Time Frame:  2020


Roll Left & Right:  4


Sit to lyin


Lying to sitting on side of be:  3


Sit to stand:  3


Chair/bed-to-chair transfer:  4


Walk 10 feet:  4


Walk 50 feet with two turns:  4





PT Long Term Goals


Long Term Goals


PT Long Term Goals Time Frame:  2020


Roll Left & Right (QC):  4 (SBA)


Sit to Lying (QC):  4 (SBA)


Lying-Sitting on Side/Bed(QC):  4 (SBA)


Sit to Stand (QC):  3 (Era)


Chair/Bed-to-Chair Xfer(QC):  4 (SBA)


Toilet Transfer (QC):  3 (Era)


Car Transfer (QC):  3 (Era)


Does the Patient Walk:  Yes


Walk 10 feet (QC):  4 (SBA)


Walk 50ft with 2 Turns (QC):  4 (SBA)


Walk 150 ft (QC):  4 (SBA)


Walking 10ft on Uneven Surface:  88


1 Step (curb) (QC):  88


4 Steps (QC):  88


12 Steps (QC):  88


Picking up an Object (QC):  88


Does the Pt use WC or Scooter?:  Yes


Wheel 50 feet with 2 turns (QC:  6


Type:  Manual


Wheel 150 feet:  6


Type:  Manual





PT Plan


Problem List


Problem List:  Activity Tolerance, Functional Strength, Safety, Balance, Gait, 

Transfer, Bed Mobility, ROM





Treatment/Plan


Treatment Plan:  Continue Plan of Care


Treatment Plan:  Bed Mobility, Education, Functional Activity Kai, Functional 

Strength, Group Therapy, Gait, Safety, Therapeutic Exercise, Transfers


Treatment Duration:  2020


Frequency:  At least 5 of 7 days/Wk (IRF)


Estimated Hrs Per Day:  1.5 hours per day


Patient and/or Family Agrees t:  Yes





Safety Risks/Education


Patient Education:  Gait Training, Transfer Techniques, Correct Positioning, W/C

Management, Safety Issues


Teaching Recipient:  Patient


Teaching Methods:  Demonstration, Discussion


Response to Teaching:  Reinforcement Needed





Time/GCodes


Time In:  815


Time Out:  915


Total Billed Treatment Time:  60


Total Billed Treatment


1 visit


GT 20'


EX 25'


FA 15'











NAVJOT STEVENS PT                 2020 09:13

## 2020-04-08 NOTE — PM&R PROGRESS NOTE
Subjective


HPI/CC On Admission


Date Seen by Provider:  Apr 8, 2020


Time Seen by Provider:  11:00


Subjective/Events-last exam


Patient doing better each day


Has ramp being built by [employer] in prep for DC home and may need a wheelchair


Labs reviewed and all stable


DVT PPX with ASA and Lovenox since h/o DVT 1990


IT will set up his computer to help him keep up with work


Percocet and Baclofen working very well for him


Suppository available if needed since he does use those at home also


BM 4/6/20 before transported to [facility]


Checked meds and labs


Reviewed therapy notes


Conferred with RN





Review of Systems


General:  Fatigue


Musculoskeletal:  leg pain


Neurological:  Weakness, Numbness, Incoordination





Objective


Exam


Vital Signs





Vital Signs








  Date Time  Temp Pulse Resp B/P (MAP) Pulse Ox O2 Delivery O2 Flow Rate FiO2


 


4/8/20 17:51 36.5 59 16 120/73 (89) 94 Room Air  





Capillary Refill : Less Than 3 SecondsLess Than 3 Seconds


General Appearance:  No Apparent Distress, WD/WN


HEENT:  PERRL/EOMI, Normal ENT Inspection, Pharynx Normal


Neck:  Full Range of Motion, Normal Inspection, Non Tender, Supple, Carotid 

Bruit


Respiratory:  Chest Non Tender, Lungs Clear, Normal Breath Sounds, No Accessory 

Muscle Use, No Respiratory Distress


Cardiovascular:  Regular Rate, Rhythm, No Edema, No Gallop, No JVD, No Murmur, 

Normal Peripheral Pulses


Gastrointestinal:  Normal Bowel Sounds, No Organomegaly, No Pulsatile Mass, Non 

Tender, Soft


Back:  Normal Inspection, No CVA Tenderness, No Vertebral Tenderness, Decreased 

Range of Motion


Extremity:  Normal Capillary Refill, Normal Inspection, Normal Range of Motion 

(except right leg), Non Tender, No Calf Tenderness, No Pedal Edema


Neurologic/Psychiatric:  Alert, Oriented x3, Normal Mood/Affect, Motor Weakness 

(all extremities 4/5, muscle wasting upper extremities)


Skin:  Normal Color, Warm/Dry


Lymphatic:  No Adenopathy





Results/Procedures


Lab


Patient resulted labs reviewed.





FIM


Transfers


Therapy Code Descriptions/Definitions 





Functional [place] Measure:


0=Not Assessed/NA        4=Minimal Assistance


1=Total Assistance        5=Supervision or Setup


2=Maximal Assistance  6=Modified [place]


3=Moderate Assistance 7=Complete IndependenceSCALE: Activities may be completed 

with or without assistive devices.





6-Indepedent-patient completes the activity by him/herself with no assistance 

from a helper.


5-Set-up or Clean-up Assistance-helper sets up or cleans up; patient completes 

activity. [place] assists only prior to or  


    following the activity.


4-Supervision or Touching Assistance-helper provides verbal cues and/or 

touching/steadying and/or contact guard assistance as patient completes 

activity. Assistance may be provided   


    throughout the activity or intermittently.


3-Partial/Moderate Assistance-helper does LESS THAN HALF the effort. [place] 

lifts, holds or supports trunk or limbs, but provides less than half the effort.


2-Substantial/Maximal Assistance-helper does MORE THAN HALF the effort. [place] 

lifts or holds trunk or limbs and provides more than half the effort.


3-Xyszutxyq-icevxe does ALL the effort. Patient does none of the effort to 

complete the activity. Or, the assistance of 2 or more helpers is required for 

the patient to complete the  


    activity.


If activity was not attempted, code reason:


7-Patient Refused.


9-Not Applicable-not attempted and the patient did not perform the activity b

efore the current illness, exacerbation or injury.


10-Not Attempted due to Environmental Limitations-(lack of equipment, weather 

restraints, etc.).


88-Not Attempted due to Medical Conditions or Safety Concerns.


Roll Left to Right (QC):  4


Sit to Lying (QC):  3


Sit to Stand (QC):  3


Chair/Bed-to-Chair Xfer(QC):  4


Car Transfer (QC):  2





Gait Training


Does the Patient Walk?:  Yes


Distance:  30', 20'x2


Walk 10 feet (QC):  4


Walk 50 ft with 2 Turns(QC):  88


Walk 150 ft (QC):  88


Walking 10ft/uneven surface-QC:  88


Gait Persons Needed:  1


Gait Assistive Device:  FWW





Wheelchair Training


Does the Pt Use a Wheelchair?:  Yes


Distance:  100'


Wheel 50 ft with 2 turns (QC):  6


Wheel 150 ft (QC):  6


Type of Wheelchair:  Manual





Stair Training


1 Step (curb) (QC):  88


4 Steps (QC):  88


12 Steps (QC):  88





Balance


Picking up an Object (QC):  88





ADL-Treatment


Eating (QC):  6


Oral Hygiene (QC):  5


Shower/Bathe Self (QC):  4 (CGA on shower bench for dymnamic movements.)


Upper Body Dressing (QC):  4


Lower Body Dressing (QC):  1 (Pt. able to thread shorts with AE while seated 

with mod assist.  Attempted to pull up pants while seated, with leaning side to 

side.  Unable to do so.  Required max assist for sit to stand of one person 

while another pulls pants over hips.)


On/Off Footwear (QC):  4 (SBA with sock aide for slipper socks.)


Toileting Hygiene (QC):  7


Toilet Transfer (QC):  7





Assessment/Plan


Assessment and Plan


Assess & Plan/Chief Complaint


Assessment:


s/p right total knee replacement POD # 8


h/o DVT


Spinal cord injury hx C4-C6


Muscle spasms severe and incapacitating acute on chronic


Neurogenic bladder


Colon atony


GERD


Acute hyponatremia


DVT PPx with ASA and Lovenox





Plan:


Lovenox


DC Fluid restriction sodium 135


Pain control


IRF protocol


Bowel and bladder management





(1) Status post right knee replacement


(2) History of spinal cord injury


(3) Muscle spasm


(4) Neurogenic bladder


(5) Colon atonic


(6) Muscle wasting


(7) Hyponatremia


(8) History of DVT (deep vein thrombosis)


(9) DVT prophylaxis


(10) Chronic GERD











EYAL NIELSEN DO                 Apr 8, 2020 12:29

## 2020-04-08 NOTE — OCCUPATIONAL THER DAILY NOTE
OT Current Status-Daily Note


Subjective


Pt. reported 7/10 pain in knee.  Nursing administers pain medication.





Appearance


Pt. up in chair when OT entered room.  Agrees to work with OT.





Mental Status/Objective


Patient Orientation:  Person, Place, Time, Situation





ADL-Treatment


Therapy Code Descriptions/Definitions 





Functional [place] Measure:


0=Not Assessed/NA        4=Minimal Assistance


1=Total Assistance        5=Supervision or Setup


2=Maximal Assistance  6=Modified [place]


3=Moderate Assistance 7=Complete IndependenceSCALE: Activities may be completed 

with or without assistive devices.





6-Indepedent-patient completes the activity by him/herself with no assistance 

from a helper.


5-Set-up or Clean-up Assistance-helper sets up or cleans up; patient completes 

activity. [place] assists only prior to or  


    following the activity.


4-Supervision or Touching Assistance-helper provides verbal cues and/or 

touching/steadying and/or contact guard assistance as patient completes 

activity. Assistance may be provided   


    throughout the activity or intermittently.


3-Partial/Moderate Assistance-helper does LESS THAN HALF the effort. [place] 

lifts, holds or supports trunk or limbs, but provides less than half the effort.


2-Substantial/Maximal Assistance-helper does MORE THAN HALF the effort. [place] 

lifts or holds trunk or limbs and provides more than half the effort.


7-Kxfgdafmp-lccqxh does ALL the effort. Patient does none of the effort to 

complete the activity. Or, the assistance of 2 or more helpers is required for 

the patient to complete the  


    activity.


If activity was not attempted, code reason:


7-Patient Refused.


9-Not Applicable-not attempted and the patient did not perform the activity 

before the current illness, exacerbation or injury.


10-Not Attempted due to Environmental Limitations-(lack of equipment, weather 

restraints, etc.).


88-Not Attempted due to Medical Conditions or Safety Concerns.


Oral Hygiene (QC):  5


Shower/Bathe Self (QC):  4 (CGA on shower bench for dymnamic movements.)


Upper Body Dressing (QC):  4


Lower Body Dressing (QC):  1 (Pt. able to thread shorts with AE while seated 

with mod assist.  Attempted to pull up pants while seated, with leaning side to 

side.  Unable to do so.  Required max assist for sit to stand of one person wh

ile another pulls pants over hips.)


On/Off Footwear:  4 (SBA with sock aide for slipper socks.)


Toileting Hygiene (QC):  7


Toilet Transfer (QC):  7


Pt. was taken to large shower room.  Transferred to tub bench from walker using 

slide board.  Pt. required mod assist with this.  Once on tub bench, able to 

lean side to side to pull down and doff shorts.  Pt. able to doff slipper socks 

with DS.  Requires SBA to shower and able to cleanse all parts by leaning side 

to side.  After dressing tasks, pt. stood with max assist with OT in front, and 

pivoted to wheelchair, as he was unable to fully get up using walker.  Pt. self 

propelled to therapy gym and completed 10 minutes on arm bike at mod resistance 

for continued UE strength.  Talked with pt. regarding home set up.  Pt. states 

that he will be contacting his employer, ([employer]), to see if someone can build a 

ramp at his home.  Pt. will also need a BSC, wheelchair, and tub bench.  Pt. 

propelled back to room and attempted x2 to stand from chair with walker.  On 

first attempt pt. unable to fully extend knees and had to sit back down.  Rested

and stood again with max assist, with assist of another person behind to 

transfer to chair.  All needs met.





Education


OT Patient Education:  Correct positioning, Exercise program, Modified ADL 

techniques, Progress toward Goal/Update tx plan, Purpose of tx/functional 

activities, Reviewed precautions, Rehab process, Transfer techniques, W/C man

agement


Teaching Recipient:  Patient


Teaching Methods:  Demonstration, Discussion


Response to Teaching:  Verbalize Understanding, Return Demonstration





OT Short Term Goals


Short Term Goals


Time Frame:  2020


Eatin


Oral hygiene:  6


Toileting hygiene:  4


Shower/bathe self:  4


Upper body dressin


Lower body dressing:  3


Putting on/taking off footwear:  3





OT Long Term Goals


Long Term Goals


Time Frame:  2020


Eating (QC):  6


Oral Hygiene (QC):  6


Toileting Hygiene (QC):  6


Shower/Bathe Self (QC):  5


Upper Body Dressing (QC):  6


Lower Body Dressing (QC):  6


On/Off Footwear (QC):  6


Additional Goals:  1-Demonstrate ADL Tasks, 2-Verbalize Understanding, 3-Improv

eStrength/Kai


1=Demonstrate adherence to instructed precautions during ADL tasks.


2=Patient will verbalize/demonstrate understanding of assistive 

devices/modifications for ADL.


3=Patient will improve strength/tolerance for activity to enable patient to p

erform ADL's.





OT Education/Plan


Problem List/Assessment


Assessment:  Decreased Activ Tolerance, Decreased UE Strength, Dependent 

Transfers, Impaired Coordination, Impaired Funct Balance, Impaired I ADL's, 

Impaired Self-Care Skills





Discharge Recommendations


Plan/Recommendations:  Continue POC


Therapy Discharge Recommendati:  Home & Family, Post Acute OT


Equpiment Recommendations-D/C:  Extended Bath Bench, Toilet Riser with Rails, 

Hip Kit





Treatment Plan/Plan of Care


Treatment,Training & Education:  Yes


Patient would benefit from OT for education, treatment and training to promote 

independence in ADL's, mobility, safety and/or upper extremity function for 

ADL's.


Plan of Care:  ADL Retraining, Functional Mobility, Group Exercise/Act as Ind, 

UE Funct Exercise/Act


Treatment Duration:  2020


Frequency:  At least 5 of 7 days/Wk (IRF)


Estimated Hrs Per Day:  1.5 hours per day


Agreement:  Yes


Rehab Potential:  Good





Time/GCodes


Start Time:  10:15


Stop Time:  11:45


Total Time Billed (hr/min):  90


Billed Treatment Time


1, ADL x 60minutes, FA x 15minutes, Ex x 15minutes











LILIANA SIMPSON OT            2020 11:47

## 2020-04-09 VITALS — SYSTOLIC BLOOD PRESSURE: 117 MMHG | DIASTOLIC BLOOD PRESSURE: 73 MMHG

## 2020-04-09 VITALS — SYSTOLIC BLOOD PRESSURE: 112 MMHG | DIASTOLIC BLOOD PRESSURE: 72 MMHG

## 2020-04-09 RX ADMIN — DOCUSATE SODIUM AND SENNOSIDES SCH EA: 8.6; 5 TABLET, FILM COATED ORAL at 21:17

## 2020-04-09 RX ADMIN — BACLOFEN SCH MG: 10 TABLET ORAL at 08:34

## 2020-04-09 RX ADMIN — OXYCODONE HYDROCHLORIDE AND ACETAMINOPHEN PRN TAB: 5; 325 TABLET ORAL at 21:57

## 2020-04-09 RX ADMIN — POLYETHYLENE GLYCOL (3350) SCH GM: 17 POWDER, FOR SOLUTION ORAL at 21:17

## 2020-04-09 RX ADMIN — ENOXAPARIN SODIUM SCH MG: 100 INJECTION SUBCUTANEOUS at 18:35

## 2020-04-09 RX ADMIN — BISACODYL PRN MG: 10 SUPPOSITORY RECTAL at 07:27

## 2020-04-09 RX ADMIN — ASPIRIN 81 MG CHEWABLE TABLET SCH MG: 81 TABLET CHEWABLE at 08:34

## 2020-04-09 RX ADMIN — OXYCODONE HYDROCHLORIDE AND ACETAMINOPHEN PRN TAB: 5; 325 TABLET ORAL at 12:15

## 2020-04-09 RX ADMIN — GABAPENTIN SCH MG: 300 CAPSULE ORAL at 21:16

## 2020-04-09 RX ADMIN — DOCUSATE SODIUM AND SENNOSIDES SCH EA: 8.6; 5 TABLET, FILM COATED ORAL at 08:34

## 2020-04-09 RX ADMIN — BACLOFEN SCH MG: 10 TABLET ORAL at 12:16

## 2020-04-09 RX ADMIN — POLYETHYLENE GLYCOL (3350) SCH GM: 17 POWDER, FOR SOLUTION ORAL at 08:35

## 2020-04-09 RX ADMIN — BACLOFEN SCH MG: 10 TABLET ORAL at 21:17

## 2020-04-09 NOTE — OCCUPATIONAL THER DAILY NOTE
OT Current Status-Daily Note


Subjective


No pain reported.





Appearance


Pt. up in wheelchair.  Has just finished with PT.  Agrees to work with OT.





Mental Status/Objective


Patient Orientation:  Person, Place, Time, Situation





ADL-Treatment


Therapy Code Descriptions/Definitions 





Functional [place] Measure:


0=Not Assessed/NA        4=Minimal Assistance


1=Total Assistance        5=Supervision or Setup


2=Maximal Assistance  6=Modified [place]


3=Moderate Assistance 7=Complete IndependenceSCALE: Activities may be completed 

with or without assistive devices.





6-Indepedent-patient completes the activity by him/herself with no assistance 

from a helper.


5-Set-up or Clean-up Assistance-helper sets up or cleans up; patient completes 

activity. [place] assists only prior to or  


    following the activity.


4-Supervision or Touching Assistance-helper provides verbal cues and/or 

touching/steadying and/or contact guard assistance as patient completes 

activity. Assistance may be provided   


    throughout the activity or intermittently.


3-Partial/Moderate Assistance-helper does LESS THAN HALF the effort. [place] 

lifts, holds or supports trunk or limbs, but provides less than half the effort.


2-Substantial/Maximal Assistance-helper does MORE THAN HALF the effort. [place] 

lifts or holds trunk or limbs and provides more than half the effort.


0-Wuqlnmmud-vnhbgx does ALL the effort. Patient does none of the effort to 

complete the activity. Or, the assistance of 2 or more helpers is required for 

the patient to complete the  


    activity.


If activity was not attempted, code reason:


7-Patient Refused.


9-Not Applicable-not attempted and the patient did not perform the activity 

before the current illness, exacerbation or injury.


10-Not Attempted due to Environmental Limitations-(lack of equipment, weather 

restraints, etc.).


88-Not Attempted due to Medical Conditions or Safety Concerns.


Pt. worked on ADL transfers and mobility.  Pt. transferred to therapy mat with 

CGA.  OT and pt. worked on sit to/from supine to simulate home transfer.  Pt. 

states that currently, he sleeps on a mat on floor, but will now use a regular 

bed.  If needed, pt. will need to use hospital bed.  Pt. states that he is okay 

with this.  Pt. utilized leg  to get right LE into and out of bed.  Pt. 

worked on smooth transitions.  However, due to prior SCI, pt. does not have tello

quate core strength.  So he often has to move in quick movements that can seem 

unsafe.  Pt. stood at parallel bar x 2 with min assist for sit-stand.  Pt. has 

technique for standing that he uses at stable surface.  Educated him on grab 

bars at home that could simulate same transfer.  Pt. verbalized understanding.  

Pt. self propelled back to room.  Transferred to reclining chair with CGA.  All 

needs met.





Education


OT Patient Education:  Correct positioning, Exercise program, Modified ADL 

techniques, Progress toward Goal/Update tx plan, Purpose of tx/functional 

activities, Reviewed precautions, Rehab process, Transfer techniques


Teaching Recipient:  Patient


Teaching Methods:  Demonstration, Discussion


Response to Teaching:  Verbalize Understanding, Return Demonstration





OT Short Term Goals


Short Term Goals


Time Frame:  2020


Eatin


Oral hygiene:  6


Toileting hygiene:  4


Shower/bathe self:  4


Upper body dressin


Lower body dressing:  3


Putting on/taking off footwear:  3





OT Long Term Goals


Long Term Goals


Time Frame:  2020


Eating (QC):  6


Oral Hygiene (QC):  6


Toileting Hygiene (QC):  6


Shower/Bathe Self (QC):  5


Upper Body Dressing (QC):  6


Lower Body Dressing (QC):  6


On/Off Footwear (QC):  6


Additional Goals:  1-Demonstrate ADL Tasks, 2-Verbalize Understanding, 3-

ImproveStrength/Kai


1=Demonstrate adherence to instructed precautions during ADL tasks.


2=Patient will verbalize/demonstrate understanding of assistive 

devices/modifications for ADL.


3=Patient will improve strength/tolerance for activity to enable patient to 

perform ADL's.





OT Education/Plan


Problem List/Assessment


Assessment:  Decreased Activ Tolerance, Dependent Transfers, Impaired Funct 

Balance, Impaired I ADL's, Impaired Self-Care Skills





Discharge Recommendations


Plan/Recommendations:  Continue POC


Therapy Discharge Recommendati:  Home & Family, Post Acute OT


Equpiment Recommendations-D/C:  Extended Bath Bench, Hip Kit





Treatment Plan/Plan of Care


Treatment,Training & Education:  Yes


Patient would benefit from OT for education, treatment and training to promote 

independence in ADL's, mobility, safety and/or upper extremity function for 

ADL's.


Plan of Care:  ADL Retraining, Functional Mobility, Group Exercise/Act as Ind, 

UE Funct Exercise/Act


Treatment Duration:  2020


Frequency:  At least 5 of 7 days/Wk (IRF)


Estimated Hrs Per Day:  1.5 hours per day


Agreement:  Yes


Rehab Potential:  Good





Time/GCodes


Start Time:  13:35


Stop Time:  14:05


Total Time Billed (hr/min):  30


Billed Treatment Time


1, FA x 2











LILIANA SIMPSON OT            2020 14:21

## 2020-04-09 NOTE — NUR
CM/SS WEEKLY TEAM CONFERENCE SUMMARY



Met with patient to review conference Summary.  Patient was just admitted 4/6/20, he is in 
agreement to continued ARU stay with proposed next review Wednesday 4/15/20.



Patient has self-coordinated that a ramp will be built at his home, eliminating that as a 
potential barrier to discharge.  Patient remains tentative about the overall level of 
functioning he will have in his right leg, he is planning for wheelchair use if that is 
necessary during his recovery period if it is prolonged.



Plan remains home at discharge.  



DME:  OT identifies that tub transfer bench will be needed.  Patient has a borrowed 
wheelchair, may need to get his own.  Therapy team to continue to assess for appropriate DME 
for patient max performance and safety.

## 2020-04-09 NOTE — PHYSICAL THERAPY DAILY NOTE
PT Daily Note-Current


Subjective


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  He is in

the wheelchair on arrival and is ready to work.





Pain





   Numeric Pain Scale:  4


   Location:  Right


   Comment:  (R) hamstring spasticity





Appearance


Pt is alert and oriented throughout treatment.





Mental Status


Patient Orientation:  Person, Place, Time, Situation


Comprehension:  7


Expression:  7


Social Interaction:  7


Problem Solvin


Memory:  7





Transfers


SCALE: Activities may be completed with or without assistive devices.





6-Indepedent-patient completes the activity by him/herself with no assistance 

from a helper.


5-Set-up or Clean-up Assistance-helper sets up or cleans up; patient completes 

activity. [place] assists only prior to or  


    following the activity.


4-Supervision or Touching Assistance-helper provides verbal cues and/or 

touching/steadying and/or contact guard assistance as patient completes 

activity. Assistance may be provided   


    throughout the activity or intermittently.


3-Partial/Moderate Assistance-helper does LESS THAN HALF the effort. [place] l

ifts, holds or supports trunk or limbs, but provides less than half the effort.


2-Substantial/Maximal Assistance-helper does MORE THAN HALF the effort. [place] 

lifts or holds trunk or limbs and provides more than half the effort.


0-Ttngyebvb-owyhsl does ALL the effort. Patient does none of the effort to 

complete the activity. Or, the assistance of 2 or more helpers is required for t

he patient to complete the  


    activity.


If activity was not attempted, code reason:


7-Patient Refused.


9-Not Applicable-not attempted and the patient did not perform the activity 

before the current illness, exacerbation or injury.


10-Not Attempted due to Environmental Limitations-(lack of equipment, weather 

restraints, etc.).


88-Not Attempted due to Medical Conditions or Safety Concerns.


Roll Left & Right (QC):  4


Sit to Lying (QC):  3


Lying to Sitting/Side of Bed(Q:  3


Sit to Stand (QC):  3


Chair/Bed-to-Chair Xfer(QC):  3





Weight Bearing


Right Lower Extremity:  Right


Weight Bearing/Tolerated





Gait Training


Does the Patient Walk?:  Yes


Distance:  90ft x2


Walk 10 feet (QC):  4


Walk 50 ft with 2 Turns(QC):  4


Gait Persons Needed:  1


Gait Assistive Device:  FWW





Exercises


Supine Ex:  LE Protocol


Supine Reps:  20


Seated Therapy Exercises:  LE Protocol


Seated Reps:  20





Treatments


Worked on (R) hamstring stretching, active and passive, including CR for quad 

and hamstring to facilitate (R) knee ROM.





Assessment


Current Status:  Good Progress


Pt is ambulating well with good stability.  Difficulty with sit to stand and 

transfers, requiring assist for the legs due to (R) hamstring spasticity.





PT Short Term Goals


Short Term Goals


Time Frame:  2020


Roll Left & Right:  4


Sit to lyin


Lying to sitting on side of be:  3


Sit to stand:  3


Chair/bed-to-chair transfer:  4


Walk 10 feet:  4


Walk 50 feet with two turns:  4





PT Long Term Goals


Long Term Goals


PT Long Term Goals Time Frame:  2020


Roll Left & Right (QC):  4 (SBA)


Sit to Lying (QC):  4 (SBA)


Lying-Sitting on Side/Bed(QC):  4 (SBA)


Sit to Stand (QC):  3 (Era)


Chair/Bed-to-Chair Xfer(QC):  4 (SBA)


Toilet Transfer (QC):  3 (Era)


Car Transfer (QC):  3 (Era)


Does the Patient Walk:  Yes


Walk 10 feet (QC):  4 (SBA)


Walk 50ft with 2 Turns (QC):  4 (SBA)


Walk 150 ft (QC):  4 (SBA)


Walking 10ft on Uneven Surface:  88


1 Step (curb) (QC):  88


4 Steps (QC):  88


12 Steps (QC):  88


Picking up an Object (QC):  88


Does the Pt use WC or Scooter?:  Yes


Wheel 50 feet with 2 turns (QC:  6


Type:  Manual


Wheel 150 feet:  6


Type:  Manual





PT Plan


Treatment/Plan


Treatment Plan:  Continue Plan of Care


Treatment Plan:  Bed Mobility, Education, Functional Activity Kai, Functional 

Strength, Group Therapy, Gait, Safety, Therapeutic Exercise, Transfers


Treatment Duration:  2020


Frequency:  At least 5 of 7 days/Wk (IRF)


Estimated Hrs Per Day:  1.5 hours per day


Patient and/or Family Agrees t:  Yes





Time/GCodes


Time In:  0945


Time Out:  1039


Total Billed Treatment Time:  54


Total Billed Treatment


1, gt x2 (24), ex x2 (30)











AZAR DILLON PT             2020 10:55

## 2020-04-09 NOTE — NUR
Patient requests suppository to help will bowel movement.  Will also get oral meds with 
morning meds.

## 2020-04-09 NOTE — PHYSICAL THERAPY DAILY NOTE
PT Daily Note-Current


Subjective


Pt is in bed and agreeable to treatment.





Pain





   Numeric Pain Scale:  5-Moderate Pain


   Location:  Right


   Comment:  hamstring spasticity





Mental Status


Patient Orientation:  Person, Place, Time, Situation





Transfers


SCALE: Activities may be completed with or without assistive devices.





6-Indepedent-patient completes the activity by him/herself with no assistance 

from a helper.


5-Set-up or Clean-up Assistance-helper sets up or cleans up; patient completes 

activity. [place] assists only prior to or  


    following the activity.


4-Supervision or Touching Assistance-helper provides verbal cues and/or 

touching/steadying and/or contact guard assistance as patient completes ac

tivity. Assistance may be provided   


    throughout the activity or intermittently.


3-Partial/Moderate Assistance-helper does LESS THAN HALF the effort. [place] 

lifts, holds or supports trunk or limbs, but provides less than half the effort.


2-Substantial/Maximal Assistance-helper does MORE THAN HALF the effort. [place] 

lifts or holds trunk or limbs and provides more than half the effort.


4-Tbaottqfn-ofvoeh does ALL the effort. Patient does none of the effort to 

complete the activity. Or, the assistance of 2 or more helpers is required for 

the patient to complete the  


    activity.


If activity was not attempted, code reason:


7-Patient Refused.


9-Not Applicable-not attempted and the patient did not perform the activity 

before the current illness, exacerbation or injury.


10-Not Attempted due to Environmental Limitations-(lack of equipment, weather 

restraints, etc.).


88-Not Attempted due to Medical Conditions or Safety Concerns.


Roll Left & Right (QC):  5


Sit to Lying (QC):  5


Lying to Sitting/Side of Bed(Q:  4


Sit to Stand (QC):  3


Chair/Bed-to-Chair Xfer(QC):  3





Weight Bearing


Right Lower Extremity:  Right


Weight Bearing/Tolerated





Gait Training


Does the Patient Walk?:  Yes


Distance:  120ft. 80ft


Walk 10 feet (QC):  4


Walk 50 ft with 2 Turns(QC):  4


Gait Assistive Device:  FWW





Exercises


Supine Ex:  LE Protocol


Supine Reps:  20


Standing:  3 way Ex=Flex, Abd, Ext


Standing Reps:  15


(R) LE standing ex


NuStep Minutes:  5


 NuStep Workload:  5





Assessment


Better knee extension while supine, ~20 degrees.  (R) knee flexion 45 degrees in

standing.  (R) knee PROM flexion 95 degrees.





PT Short Term Goals


Short Term Goals


Time Frame:  2020


Roll Left & Right:  4


Sit to lyin


Lying to sitting on side of be:  3


Sit to stand:  3


Chair/bed-to-chair transfer:  4


Walk 10 feet:  4


Walk 50 feet with two turns:  4





PT Long Term Goals


Long Term Goals


PT Long Term Goals Time Frame:  2020


Roll Left & Right (QC):  4 (SBA)


Sit to Lying (QC):  4 (SBA)


Lying-Sitting on Side/Bed(QC):  4 (SBA)


Sit to Stand (QC):  3 (Era)


Chair/Bed-to-Chair Xfer(QC):  4 (SBA)


Toilet Transfer (QC):  3 (Era)


Car Transfer (QC):  3 (Era)


Does the Patient Walk:  Yes


Walk 10 feet (QC):  4 (SBA)


Walk 50ft with 2 Turns (QC):  4 (SBA)


Walk 150 ft (QC):  4 (SBA)


Walking 10ft on Uneven Surface:  88


1 Step (curb) (QC):  88


4 Steps (QC):  88


12 Steps (QC):  88


Picking up an Object (QC):  88


Does the Pt use WC or Scooter?:  Yes


Wheel 50 feet with 2 turns (QC:  6


Type:  Manual


Wheel 150 feet:  6


Type:  Manual





PT Plan


Treatment/Plan


Treatment Plan:  Continue Plan of Care


Treatment Plan:  Bed Mobility, Education, Functional Activity Kai, Functional 

Strength, Group Therapy, Gait, Safety, Therapeutic Exercise, Transfers


Treatment Duration:  2020


Frequency:  At least 5 of 7 days/Wk (IRF)


Estimated Hrs Per Day:  1.5 hours per day


Patient and/or Family Agrees t:  Yes





Time/GCodes


Time In:  1300


Time Out:  1338


Total Billed Treatment Time:  38


Total Billed Treatment


1, gt (15), ex x2 (23)











AZAR DILLON PT             2020 14:39

## 2020-04-09 NOTE — OCCUPATIONAL THER DAILY NOTE
OT Current Status-Daily Note


Subjective


Pt. reports pain in right knee with movement but does not report pain level.  

Pt. given pain medication by nursing.





Appearance


Pt. in bed.  Agrees to work with OT.





Mental Status/Objective


Patient Orientation:  Person, Place, Time, Situation





ADL-Treatment


Therapy Code Descriptions/Definitions 





Functional [place] Measure:


0=Not Assessed/NA        4=Minimal Assistance


1=Total Assistance        5=Supervision or Setup


2=Maximal Assistance  6=Modified [place]


3=Moderate Assistance 7=Complete IndependenceSCALE: Activities may be completed 

with or without assistive devices.





6-Indepedent-patient completes the activity by him/herself with no assistance 

from a helper.


5-Set-up or Clean-up Assistance-helper sets up or cleans up; patient completes 

activity. [place] assists only prior to or  


    following the activity.


4-Supervision or Touching Assistance-helper provides verbal cues and/or 

touching/steadying and/or contact guard assistance as patient completes 

activity. Assistance may be provided   


    throughout the activity or intermittently.


3-Partial/Moderate Assistance-helper does LESS THAN HALF the effort. [place] 

lifts, holds or supports trunk or limbs, but provides less than half the effort.


2-Substantial/Maximal Assistance-helper does MORE THAN HALF the effort. [place] 

lifts or holds trunk or limbs and provides more than half the effort.


9-Lznoklrqa-jtdtfz does ALL the effort. Patient does none of the effort to c

omplete the activity. Or, the assistance of 2 or more helpers is required for 

the patient to complete the  


    activity.


If activity was not attempted, code reason:


7-Patient Refused.


9-Not Applicable-not attempted and the patient did not perform the activity 

before the current illness, exacerbation or injury.


10-Not Attempted due to Environmental Limitations-(lack of equipment, weather 

restraints, etc.).


88-Not Attempted due to Medical Conditions or Safety Concerns.


Oral Hygiene (QC):  6 (Mod I from wheelchair level at sink.)


Upper Body Dressing (QC):  4 (SBA at bed level to doff/don shirt.)


Lower Body Dressing (QC):  4 (SBA and increased time, with use of AE to doff 

shorts and don shorts at bed level in supine position.)


Pt. agrees to treatment.  Practiced dressing in bed, as yesterday he had 

difficulty while seated on tub bench in shower room.  Pt. able to roll side to 

side to doff/don shorts over hips.  Required AE to doff/don over feet.  Tra

nsferred supine-sit with SBA. OT and pt. talked about what transfers he has to 

be able to do at home for functional independence.  Pt. states that at home, he 

is able to transfer to his toilet from his chair, and to his bed.  He does this 

by sliding over from his wheelchair.  Pt. practiced sliding from wheelchair to 

shower chair with SBA.  This was fully clothed however.  Also utilized slide 

board to transfer from bed to wheelchair with min assist.  Due to inability to 

fully extend right LE at this time, pt. is having difficulty with sit-stand.  To

make tasks easier for family at home, OT and pt. problem solved easiest and 

safest way for him to complete all transfers and ADLs.  At this time, using 

slide board and scooting to and from surface seems to be easier for him.  These 

goals will be continued in conjunction with sit-stand transfers.  Pt. stood at 

parallel bar with min assist for sit-stand.  Pt. able to stand approximately 1 

minute, but unable to fully place right LE on floor.  Noted right hamstring very

tight, with occasional muscle spasms.  Pt. transferred back to wheelchair and 

self propelled back to room.  All needs met.





Education


OT Patient Education:  Correct positioning, Modified ADL techniques, Progress 

toward Goal/Update tx plan, Purpose of tx/functional activities, Reviewed 

precautions, Rehab process, Transfer techniques


Teaching Recipient:  Patient


Teaching Methods:  Demonstration, Discussion


Response to Teaching:  Verbalize Understanding, Return Demonstration





OT Short Term Goals


Short Term Goals


Time Frame:  2020


Eatin


Oral hygiene:  6


Toileting hygiene:  4


Shower/bathe self:  4


Upper body dressin


Lower body dressing:  3


Putting on/taking off footwear:  3





OT Long Term Goals


Long Term Goals


Time Frame:  2020


Eating (QC):  6


Oral Hygiene (QC):  6


Toileting Hygiene (QC):  6


Shower/Bathe Self (QC):  5


Upper Body Dressing (QC):  6


Lower Body Dressing (QC):  6


On/Off Footwear (QC):  6


Additional Goals:  1-Demonstrate ADL Tasks, 2-Verbalize Understanding, 3-

ImproveStrength/Kai


1=Demonstrate adherence to instructed precautions during ADL tasks.


2=Patient will verbalize/demonstrate understanding of assistive grisel[place]

justo/modifications for ADL.


3=Patient will improve strength/tolerance for activity to enable patient to 

perform ADL's.





OT Education/Plan


Problem List/Assessment


Assessment:  Decreased Activ Tolerance, Dependent Transfers, Impaired Bed 

Mobility, Impaired Funct Balance, Impaired I ADL's, Impaired Self-Care Skills





Discharge Recommendations


Plan/Recommendations:  Continue POC


Therapy Discharge Recommendati:  Home & Family, Post Acute OT


Equpiment Recommendations-D/C:  Extended Bath Bench, Hip Kit





Treatment Plan/Plan of Care


Treatment,Training & Education:  Yes


Patient would benefit from OT for education, treatment and training to promote 

independence in ADL's, mobility, safety and/or upper extremity function for 

ADL's.


Plan of Care:  ADL Retraining, Functional Mobility, Group Exercise/Act as Ind, 

UE Funct Exercise/Act


Treatment Duration:  2020


Frequency:  At least 5 of 7 days/Wk (IRF)


Estimated Hrs Per Day:  1.5 hours per day


Agreement:  Yes


Rehab Potential:  Good





Time/GCodes


Start Time:  08:30


Stop Time:  09:30


Total Time Billed (hr/min):  60


Billed Treatment Time


1, ADL x 30minutes, FA x 30minutes











LILIANA SIMPSON OT            2020 11:21

## 2020-04-09 NOTE — PM&R PROGRESS NOTE
Subjective


HPI/CC On Admission


Date Seen by Provider:  2020


Time Seen by Provider:  10:30


Subjective/Events-last exam


Patient in good spirits today


Most therapy focuses on residual from spinal cord injury 


No falls but high risk


Reviewed meds used


DVT PPX with ASA and Lovenox since h/o DVT 


IT helped set up his computer


Percocet and Baclofen working very well for him


Suppository will be given today


Checked meds and labs


Reviewed therapy notes


Conferred with RN





Review of Systems


Gastrointestinal:  Constipation


Musculoskeletal:  leg pain


Neurological:  Weakness, Numbness, Incoordination





Objective


Exam


Vital Signs





Vital Signs








  Date Time  Temp Pulse Resp B/P (MAP) Pulse Ox O2 Delivery O2 Flow Rate FiO2


 


4/10/20 06:16 36.6 64 18 111/68 (82) 96 Room Air  





Capillary Refill : Less Than 3 SecondsLess Than 3 Seconds


General Appearance:  No Apparent Distress, WD/WN


HEENT:  PERRL/EOMI, Normal ENT Inspection, Pharynx Normal


Neck:  Full Range of Motion, Normal Inspection, Non Tender, Supple, Carotid 

Bruit


Respiratory:  Chest Non Tender, Lungs Clear, Normal Breath Sounds, No Accessory 

Muscle Use, No Respiratory Distress


Cardiovascular:  Regular Rate, Rhythm, No Edema, No Gallop, No JVD, No Murmur, 

Normal Peripheral Pulses


Gastrointestinal:  Normal Bowel Sounds, No Organomegaly, No Pulsatile Mass, Non 

Tender, Soft


Back:  Normal Inspection, No CVA Tenderness, No Vertebral Tenderness, Decreased 

Range of Motion


Extremity:  Normal Capillary Refill, Normal Inspection, Normal Range of Motion 

(except right leg), Non Tender, No Calf Tenderness, No Pedal Edema


Neurologic/Psychiatric:  Alert, Oriented x3, Normal Mood/Affect, Motor Weakness 

(all extremities 4/5, muscle wasting upper extremities)


Skin:  Normal Color, Warm/Dry


Lymphatic:  No Adenopathy





Results/Procedures


Lab


Patient resulted labs reviewed.





FIM


Transfers


Therapy Code Descriptions/Definitions 





Functional [place] Measure:


0=Not Assessed/NA        4=Minimal Assistance


1=Total Assistance        5=Supervision or Setup


2=Maximal Assistance  6=Modified [place]


3=Moderate Assistance 7=Complete IndependenceSCALE: Activities may be completed 

with or without assistive devices.





6-Indepedent-patient completes the activity by him/herself with no assistance 

from a helper.


5-Set-up or Clean-up Assistance-helper sets up or cleans up; patient completes 

activity. [place] assists only prior to or  


    following the activity.


4-Supervision or Touching Assistance-helper provides verbal cues and/or 

touching/steadying and/or contact guard assistance as patient completes 

activity. Assistance may be provided   


    throughout the activity or intermittently.


3-Partial/Moderate Assistance-helper does LESS THAN HALF the effort. [place] 

lifts, holds or supports trunk or limbs, but provides less than half the effort.


2-Substantial/Maximal Assistance-helper does MORE THAN HALF the effort. [place] 

lifts or holds trunk or limbs and provides more than half the effort.


9-Pdxfcbyjb-issqbe does ALL the effort. Patient does none of the effort to 

complete the activity. Or, the assistance of 2 or more helpers is required for 

the patient to complete the  


    activity.


If activity was not attempted, code reason:


7-Patient Refused.


9-Not Applicable-not attempted and the patient did not perform the activity 

before the current illness, exacerbation or injury.


10-Not Attempted due to Environmental Limitations-(lack of equipment, weather 

restraints, etc.).


88-Not Attempted due to Medical Conditions or Safety Concerns.


Roll Left to Right (QC):  4


Sit to Lying (QC):  3


Sit to Stand (QC):  3


Chair/Bed-to-Chair Xfer(QC):  3


Car Transfer (QC):  2





Gait Training


Does the Patient Walk?:  Yes


Distance:  90ft x2


Walk 10 feet (QC):  4


Walk 50 ft with 2 Turns(QC):  4


Walk 150 ft (QC):  88


Walking 10ft/uneven surface-QC:  88


Gait Persons Needed:  1


Gait Assistive Device:  FWW





Wheelchair Training


Does the Pt Use a Wheelchair?:  Yes


Distance:  100'


Wheel 50 ft with 2 turns (QC):  5


Wheel 150 ft (QC):  5


Type of Wheelchair:  Manual





Stair Training


1 Step (curb) (QC):  88


4 Steps (QC):  88


12 Steps (QC):  88





Balance


Picking up an Object (QC):  88





Mental Status/Objective


Comprehension:  7


Expression:  7


Social Interaction:  7


Problem Solvin


Memory:  7





ADL-Treatment


Eating (QC):  6


Oral Hygiene (QC):  6 (Mod I from wheelchair level at sink.)


Shower/Bathe Self (QC):  4 (CGA on shower bench for dymnamic movements.)


Upper Body Dressing (QC):  4 (SBA at bed level to doff/don shirt.)


Lower Body Dressing (QC):  4 (SBA and increased time, with use of AE to doff 

shorts and don shorts at bed level in supine position.)


On/Off Footwear (QC):  4 (SBA with sock aide for slipper socks.)


Toileting Hygiene (QC):  7


Toilet Transfer (QC):  7





Assessment/Plan


Assessment and Plan


Assess & Plan/Chief Complaint


Assessment:


s/p right total knee replacement POD # 9


h/o DVT


Spinal cord injury hx C4-C6 


Muscle spasms severe and incapacitating acute on chronic


Neurogenic bladder


Colon atony


GERD


s/p acute hyponatremia


DVT PPx with ASA and Lovenox





Plan:


Lovenox


DC Fluid restriction since sodium 135


Pain control


IRF protocol


Bowel and bladder management





(1) Status post right knee replacement


(2) History of spinal cord injury


(3) Muscle spasm


(4) Neurogenic bladder


(5) Colon atonic


(6) Muscle wasting


(7) Hyponatremia


(8) History of DVT (deep vein thrombosis)


(9) DVT prophylaxis


(10) Chronic GERD











EYAL NIELSEN DO                 2020 12:07

## 2020-04-10 VITALS — SYSTOLIC BLOOD PRESSURE: 116 MMHG | DIASTOLIC BLOOD PRESSURE: 75 MMHG

## 2020-04-10 VITALS — SYSTOLIC BLOOD PRESSURE: 111 MMHG | DIASTOLIC BLOOD PRESSURE: 68 MMHG

## 2020-04-10 RX ADMIN — POLYETHYLENE GLYCOL (3350) SCH GM: 17 POWDER, FOR SOLUTION ORAL at 21:08

## 2020-04-10 RX ADMIN — POLYETHYLENE GLYCOL (3350) SCH GM: 17 POWDER, FOR SOLUTION ORAL at 10:58

## 2020-04-10 RX ADMIN — ASPIRIN 81 MG CHEWABLE TABLET SCH MG: 81 TABLET CHEWABLE at 09:44

## 2020-04-10 RX ADMIN — DOCUSATE SODIUM AND SENNOSIDES SCH EA: 8.6; 5 TABLET, FILM COATED ORAL at 21:07

## 2020-04-10 RX ADMIN — OXYCODONE HYDROCHLORIDE AND ACETAMINOPHEN PRN TAB: 5; 325 TABLET ORAL at 19:33

## 2020-04-10 RX ADMIN — DOCUSATE SODIUM AND SENNOSIDES SCH EA: 8.6; 5 TABLET, FILM COATED ORAL at 10:44

## 2020-04-10 RX ADMIN — BACLOFEN SCH MG: 10 TABLET ORAL at 21:07

## 2020-04-10 RX ADMIN — BACLOFEN SCH MG: 10 TABLET ORAL at 13:26

## 2020-04-10 RX ADMIN — OXYCODONE HYDROCHLORIDE AND ACETAMINOPHEN PRN TAB: 5; 325 TABLET ORAL at 10:57

## 2020-04-10 RX ADMIN — ENOXAPARIN SODIUM SCH MG: 100 INJECTION SUBCUTANEOUS at 17:52

## 2020-04-10 RX ADMIN — BACLOFEN SCH MG: 10 TABLET ORAL at 09:43

## 2020-04-10 RX ADMIN — GABAPENTIN SCH MG: 300 CAPSULE ORAL at 21:07

## 2020-04-10 NOTE — NUR
Pt turned on call lt & asked if someone, "could take him outside to get some fresh air ?  Or 
I'm going to have a panic attack."  Assisted to transfer from bed to w/c, then PCT taking pt 
outside per request to get fresh air.

## 2020-04-10 NOTE — OCCUPATIONAL THER DAILY NOTE
OT Current Status-Daily Note


Subjective


Pt in bed, agrees to therapy. States pain is "not too bad right now," but 

doesn't rate.





ADL-Treatment


Pt supine to sit using bed rail. Pt declined shower, but agrees to sponge bath. 

Pt transferred bed to w/c with min assist for safety. W/c mobility to restroom 

without assist. Pt completed sponge bath while seated at sink. Upper body 

bathing completed with set up. Pt declined to remove shorts, but bathed upper 

legs as able. Used dressing stick to doff socks. Used long handled sponge to 

wash lower legs and feet. Don pullover shirt with set up. Pt donned socks with 

set up using sock aid. Brushed teeth and combed hair with modified independence 

while seated at sink. Increased time for ADLs. Pt performed w/c mobility around 

therapy unit without assist. Pt sitting in w/c with needs met after session.


Therapy Code Descriptions/Definitions 





Functional [place] Measure:


0=Not Assessed/NA        4=Minimal Assistance


1=Total Assistance        5=Supervision or Setup


2=Maximal Assistance  6=Modified [place]


3=Moderate Assistance 7=Complete IndependenceSCALE: Activities may be completed 

with or without assistive devices.





6-Indepedent-patient completes the activity by him/herself with no assistance 

from a helper.


5-Set-up or Clean-up Assistance-helper sets up or cleans up; patient completes 

activity. [place] assists only prior to or  


    following the activity.


4-Supervision or Touching Assistance-helper provides verbal cues and/or 

touching/steadying and/or contact guard assistance as patient completes 

activity. Assistance may be provided   


    throughout the activity or intermittently.


3-Partial/Moderate Assistance-helper does LESS THAN HALF the effort. [place] 

lifts, holds or supports trunk or limbs, but provides less than half the effort.


2-Substantial/Maximal Assistance-helper does MORE THAN HALF the effort. [place] 

lifts or holds trunk or limbs and provides more than half the effort.


9-Sslyjdyzr-nvbbpz does ALL the effort. Patient does none of the effort to 

complete the activity. Or, the assistance of 2 or more helpers is required for 

the patient to complete the  


    activity.


If activity was not attempted, code reason:


7-Patient Refused.


9-Not Applicable-not attempted and the patient did not perform the activity b

efore the current illness, exacerbation or injury.


10-Not Attempted due to Environmental Limitations-(lack of equipment, weather 

restraints, etc.).


88-Not Attempted due to Medical Conditions or Safety Concerns.


Oral Hygiene (QC):  6


Upper Body Dressing (QC):  5


On/Off Footwear:  5





OT Short Term Goals


Short Term Goals


Time Frame:  2020


Eatin


Oral hygiene:  6


Toileting hygiene:  4


Shower/bathe self:  4


Upper body dressin


Lower body dressing:  3


Putting on/taking off footwear:  3





OT Long Term Goals


Long Term Goals


Time Frame:  2020


Eating (QC):  6


Oral Hygiene (QC):  6


Toileting Hygiene (QC):  6


Shower/Bathe Self (QC):  5


Upper Body Dressing (QC):  6


Lower Body Dressing (QC):  6


On/Off Footwear (QC):  6


Additional Goals:  1-Demonstrate ADL Tasks, 2-Verbalize Understanding, 3-Imp

roveStrength/Kai


1=Demonstrate adherence to instructed precautions during ADL tasks.


2=Patient will verbalize/demonstrate understanding of assistive 

devices/modifications for ADL.


3=Patient will improve strength/tolerance for activity to enable patient to 

perform ADL's.





OT Education/Plan


Discharge Recommendations


Plan/Recommendations:  Continue POC





Treatment Plan/Plan of Care


Patient would benefit from OT for education, treatment and training to promote 

independence in ADL's, mobility, safety and/or upper extremity function for 

ADL's.


Plan of Care:  ADL Retraining, Functional Mobility, Group Exercise/Act as Ind, 

UE Funct Exercise/Act


Treatment Duration:  2020


Frequency:  At least 5 of 7 days/Wk (IRF)


Estimated Hrs Per Day:  1.5 hours per day


Agreement:  Yes


Rehab Potential:  Good





Time/GCodes


Start Time:  08:00


Stop Time:  09:00


Total Time Billed (hr/min):  60


Billed Treatment Time


1 visit, ADLx4(60minutes)











JOHNNY SMITH OT            Apr 10, 2020 10:17

## 2020-04-10 NOTE — PHYSICAL THERAPY DAILY NOTE
PT Daily Note-Current


Subjective


Patient in WC at bedside pre tx, agrees to PT, has 7/10 pain in right knee, 

states he will ask for pain meds after PT.





Appearance


Patient in WC at bedside post tx with nurse call, phone, tray, all needs met.





Mental Status


Patient Orientation:  Normal For Age





Transfers


SCALE: Activities may be completed with or without assistive devices.





6-Indepedent-patient completes the activity by him/herself with no assistance 

from a helper.


5-Set-up or Clean-up Assistance-helper sets up or cleans up; patient completes 

activity. [place] assists only prior to or  


    following the activity.


4-Supervision or Touching Assistance-helper provides verbal cues and/or 

touching/steadying and/or contact guard assistance as patient completes 

activity. Assistance may be provided   


    throughout the activity or intermittently.


3-Partial/Moderate Assistance-helper does LESS THAN HALF the effort. [place] 

lifts, holds or supports trunk or limbs, but provides less than half the effort.


2-Substantial/Maximal Assistance-helper does MORE THAN HALF the effort. [place] 

lifts or holds trunk or limbs and provides more than half the effort.


1-Jvlpieubb-rvpccb does ALL the effort. Patient does none of the effort to 

complete the activity. Or, the assistance of 2 or more helpers is required for 

the patient to complete the  


    activity.


If activity was not attempted, code reason:


7-Patient Refused.


9-Not Applicable-not attempted and the patient did not perform the activity 

before the current illness, exacerbation or injury.


10-Not Attempted due to Environmental Limitations-(lack of equipment, weather 

restraints, etc.).


88-Not Attempted due to Medical Conditions or Safety Concerns.


Sit to Stand (QC):  3


Chair/Bed-to-Chair Xfer(QC):  4


mod assist for sit to stand from lower surfaces, CGA for transfers





Weight Bearing


Right Lower Extremity:  Right


Weight Bearing/Tolerated





Gait Training


Distance:  80'x2


Walk 10 feet (QC):  4


Walk 50 ft with 2 Turns(QC):  4


Gait Persons Needed:  1


Gait Assistive Device:  FWW


slow, antalgic, flexed bilateral knees, no heel strike on right side, narrow 

ANT, patient is not able to bear much weight through his right leg.





Wheelchair Training


Does the Pt Use a Wheelchair?:  Yes


Wheel 50 ft with 2 turns (QC):  6


Wheel 150 ft (QC):  6


Type of Wheelchair:  Manual


150'x2





Exercises


seated manual flex/ext stretching of right knee


NuStep Minutes:  15


 NuStep Workload:  4





Treatments


WC mobility, transfers, ambulation, stretching, LE strengthening





Assessment


Current Status:  Fair Progress


slow improvement





PT Short Term Goals


Short Term Goals


Time Frame:  2020


Roll Left & Right:  4


Sit to lyin


Lying to sitting on side of be:  3


Sit to stand:  3


Chair/bed-to-chair transfer:  4


Walk 10 feet:  4


Walk 50 feet with two turns:  4





PT Long Term Goals


Long Term Goals


PT Long Term Goals Time Frame:  2020


Roll Left & Right (QC):  4 (SBA)


Sit to Lying (QC):  4 (SBA)


Lying-Sitting on Side/Bed(QC):  4 (SBA)


Sit to Stand (QC):  3 (Era)


Chair/Bed-to-Chair Xfer(QC):  4 (SBA)


Toilet Transfer (QC):  3 (Era)


Car Transfer (QC):  3 (Era)


Does the Patient Walk:  Yes


Walk 10 feet (QC):  4 (SBA)


Walk 50ft with 2 Turns (QC):  4 (SBA)


Walk 150 ft (QC):  4 (SBA)


Walking 10ft on Uneven Surface:  88


1 Step (curb) (QC):  88


4 Steps (QC):  88


12 Steps (QC):  88


Picking up an Object (QC):  88


Does the Pt use WC or Scooter?:  Yes


Wheel 50 feet with 2 turns (QC:  6


Type:  Manual


Wheel 150 feet:  6


Type:  Manual





PT Plan


Problem List


Problem List:  Activity Tolerance, Functional Strength, Safety, Balance, Gait, 

Transfer, Bed Mobility, ROM





Treatment/Plan


Treatment Plan:  Continue Plan of Care


Treatment Plan:  Bed Mobility, Education, Functional Activity Kai, Functional 

Strength, Group Therapy, Gait, Safety, Therapeutic Exercise, Transfers


Treatment Duration:  2020


Frequency:  At least 5 of 7 days/Wk (IRF)


Estimated Hrs Per Day:  1.5 hours per day


Patient and/or Family Agrees t:  Yes





Safety Risks/Education


Patient Education:  Gait Training, Transfer Techniques, Correct Positioning, W/C

Management, Safety Issues


Teaching Recipient:  Patient


Teaching Methods:  Demonstration, Discussion


Response to Teaching:  Reinforcement Needed





Time/GCodes


Time In:  0900


Time Out:  1000


Total Billed Treatment Time:  60


Total Billed Treatment


1 visit


EX 20'


GT 30'


FA 10'











NAVJOT STEVENS PT                Apr 10, 2020 09:54

## 2020-04-10 NOTE — PHYSICAL THERAPY DAILY NOTE
PT Daily Note-Current


Subjective


Patient in recliner pre tx, agrees to PT, voices no complaints of pain.  Dr. Feliciano called and wants patient's right knee to be stretched into extension 

every hour.  Nursing notified.





Appearance


Patient in WC post tx with nurse call, phone, tray, all needs met.  Knee 

immobilizer on.





Mental Status


Patient Orientation:  Normal For Age





Transfers


SCALE: Activities may be completed with or without assistive devices.





6-Indepedent-patient completes the activity by him/herself with no assistance 

from a helper.


5-Set-up or Clean-up Assistance-helper sets up or cleans up; patient completes 

activity. [place] assists only prior to or  


    following the activity.


4-Supervision or Touching Assistance-helper provides verbal cues and/or 

touching/steadying and/or contact guard assistance as patient completes 

activity. Assistance may be provided   


    throughout the activity or intermittently.


3-Partial/Moderate Assistance-helper does LESS THAN HALF the effort. [place] 

lifts, holds or supports trunk or limbs, but provides less than half the effort.


2-Substantial/Maximal Assistance-helper does MORE THAN HALF the effort. [place] 

lifts or holds trunk or limbs and provides more than half the effort.


8-Nsavlpnoc-zkclud does ALL the effort. Patient does none of the effort to 

complete the activity. Or, the assistance of 2 or more helpers is required for 

the patient to complete the  


    activity.


If activity was not attempted, code reason:


7-Patient Refused.


9-Not Applicable-not attempted and the patient did not perform the activity 

before the current illness, exacerbation or injury.


10-Not Attempted due to Environmental Limitations-(lack of equipment, weather 

restraints, etc.).


88-Not Attempted due to Medical Conditions or Safety Concerns.


Chair/Bed-to-Chair Xfer(QC):  3


Patient transferred to table in therapy room and layed down, performed knee 

extension stretching (manual), supine -> sit and then back to WC





Weight Bearing


Right Lower Extremity:  Right


Weight Bearing/Tolerated





Wheelchair Training


Does the Pt Use a Wheelchair?:  Yes


Wheel 50 ft with 2 turns (QC):  6


Wheel 150 ft (QC):  6


Type of Wheelchair:  Manual





Exercises


right knee extension stretching, manual





Treatments


bed mobility and transfers, WC mobility, stretching





Assessment


Current Status:  Fair Progress


improved knee extension (R) to 10 degrees from full extension





PT Short Term Goals


Short Term Goals


Time Frame:  2020


Roll Left & Right:  4


Sit to lyin


Lying to sitting on side of be:  3


Sit to stand:  3


Chair/bed-to-chair transfer:  4


Walk 10 feet:  4


Walk 50 feet with two turns:  4





PT Long Term Goals


Long Term Goals


PT Long Term Goals Time Frame:  2020


Roll Left & Right (QC):  4 (SBA)


Sit to Lying (QC):  4 (SBA)


Lying-Sitting on Side/Bed(QC):  4 (SBA)


Sit to Stand (QC):  3 (Era)


Chair/Bed-to-Chair Xfer(QC):  4 (SBA)


Toilet Transfer (QC):  3 (Era)


Car Transfer (QC):  3 (Era)


Does the Patient Walk:  Yes


Walk 10 feet (QC):  4 (SBA)


Walk 50ft with 2 Turns (QC):  4 (SBA)


Walk 150 ft (QC):  4 (SBA)


Walking 10ft on Uneven Surface:  88


1 Step (curb) (QC):  88


4 Steps (QC):  88


12 Steps (QC):  88


Picking up an Object (QC):  88


Does the Pt use WC or Scooter?:  Yes


Wheel 50 feet with 2 turns (QC:  6


Type:  Manual


Wheel 150 feet:  6


Type:  Manual





PT Plan


Problem List


Problem List:  Activity Tolerance, Functional Strength, Safety, Balance, Gait, 

Transfer, Bed Mobility, ROM





Treatment/Plan


Treatment Plan:  Continue Plan of Care


Treatment Plan:  Bed Mobility, Education, Functional Activity Kai, Functional 

Strength, Group Therapy, Gait, Safety, Therapeutic Exercise, Transfers


Treatment Duration:  2020


Frequency:  At least 5 of 7 days/Wk (IRF)


Estimated Hrs Per Day:  1.5 hours per day


Patient and/or Family Agrees t:  Yes





Safety Risks/Education


Patient Education:  Correct Positioning, W/C Management, Safety Issues


Teaching Recipient:  Patient


Teaching Methods:  Demonstration, Discussion


Response to Teaching:  Reinforcement Needed





Time/GCodes


Time In:  1300


Time Out:  1330


Total Billed Treatment Time:  30


Total Billed Treatment


1 visit


FA 10'


EX 20'











NAVJOT STEVENS PT                Apr 10, 2020 13:30

## 2020-04-10 NOTE — PM&R PROGRESS NOTE
Subjective


HPI/CC On Admission


Date Seen by Provider:  Apr 10, 2020


Time Seen by Provider:  10:45


Subjective/Events-last exam


Patient doing better each day


Has ramp being built by [facility] in prep for DC home and may need a wheelchair and he

is using his w/c today in the group room


Spoke to Dr Braden Messer at [facility] last night in-depth and he was concerned 

about his lack of motivation and decreased ROM of the knee and extension so I 

texted him the info he requested and then he asked to speak to his PT and that 

was accomplished to Dr Feliciano' expectations.


Mentioned about ortho appt Monday and that may need arranged 


DVT PPX with ASA and Lovenox since h/o DVT 


IT helped set up his computer


Percocet and Baclofen working very well for him


Suppository didn't really work well so we will try Fleets and if that doesn't 

work SSE


Checked meds and labs


Reviewed therapy notes


Conferred with RN





Review of Systems


General:  Fatigue


Gastrointestinal:  Constipation


Musculoskeletal:  leg pain


Neurological:  Weakness, Numbness, Incoordination





Objective


Exam


Vital Signs





Vital Signs








  Date Time  Temp Pulse Resp B/P (MAP) Pulse Ox O2 Delivery O2 Flow Rate FiO2


 


4/10/20 06:16 36.6 64 18 111/68 (82) 96 Room Air  





Capillary Refill : Less Than 3 SecondsLess Than 3 Seconds


General Appearance:  No Apparent Distress, WD/WN


HEENT:  PERRL/EOMI, Normal ENT Inspection, Pharynx Normal


Neck:  Full Range of Motion, Normal Inspection, Non Tender, Supple, Carotid 

Bruit


Respiratory:  Chest Non Tender, Lungs Clear, Normal Breath Sounds, No Accessory 

Muscle Use, No Respiratory Distress


Cardiovascular:  Regular Rate, Rhythm, No Edema, No Gallop, No JVD, No Murmur, 

Normal Peripheral Pulses


Gastrointestinal:  Normal Bowel Sounds, No Organomegaly, No Pulsatile Mass, Non 

Tender, Soft


Back:  Normal Inspection, No CVA Tenderness, No Vertebral Tenderness, Decreased 

Range of Motion


Extremity:  Normal Capillary Refill, Normal Inspection, Normal Range of Motion 

(except right leg), Non Tender, No Calf Tenderness, No Pedal Edema


Neurologic/Psychiatric:  Alert, Oriented x3, Normal Mood/Affect, Motor Weakness 

(all extremities 4/5, muscle wasting upper extremities)


Skin:  Normal Color, Warm/Dry


Lymphatic:  No Adenopathy





Results/Procedures


Lab


Patient resulted labs reviewed.





FIM


Transfers


Therapy Code Descriptions/Definitions 





Functional [place] Measure:


0=Not Assessed/NA        4=Minimal Assistance


1=Total Assistance        5=Supervision or Setup


2=Maximal Assistance  6=Modified [place]


3=Moderate Assistance 7=Complete IndependenceSCALE: Activities may be completed 

with or without assistive devices.





6-Indepedent-patient completes the activity by him/herself with no assistance 

from a helper.


5-Set-up or Clean-up Assistance-helper sets up or cleans up; patient completes 

activity. [place] assists only prior to or  


    following the activity.


4-Supervision or Touching Assistance-helper provides verbal cues and/or 

touching/steadying and/or contact guard assistance as patient completes 

activity. Assistance may be provided   


    throughout the activity or intermittently.


3-Partial/Moderate Assistance-helper does LESS THAN HALF the effort. [place] 

lifts, holds or supports trunk or limbs, but provides less than half the effort.


2-Substantial/Maximal Assistance-helper does MORE THAN HALF the effort. [place] 

lifts or holds trunk or limbs and provides more than half the effort.


2-Yuqjakaef-asurql does ALL the effort. Patient does none of the effort to 

complete the activity. Or, the assistance of 2 or more helpers is required for 

the patient to complete the  


    activity.


If activity was not attempted, code reason:


7-Patient Refused.


9-Not Applicable-not attempted and the patient did not perform the activity 

before the current illness, exacerbation or injury.


10-Not Attempted due to Environmental Limitations-(lack of equipment, weather 

restraints, etc.).


88-Not Attempted due to Medical Conditions or Safety Concerns.


Roll Left to Right (QC):  5


Sit to Lying (QC):  5


Sit to Stand (QC):  3


Chair/Bed-to-Chair Xfer(QC):  4


Car Transfer (QC):  2





Gait Training


Does the Patient Walk?:  Yes


Distance:  80'x2


Walk 10 feet (QC):  4


Walk 50 ft with 2 Turns(QC):  4


Walk 150 ft (QC):  88


Walking 10ft/uneven surface-QC:  88


Gait Persons Needed:  1


Gait Assistive Device:  FWW





Wheelchair Training


Does the Pt Use a Wheelchair?:  Yes


Distance:  100'


Wheel 50 ft with 2 turns (QC):  6


Wheel 150 ft (QC):  6


Type of Wheelchair:  Manual





Stair Training


1 Step (curb) (QC):  88


4 Steps (QC):  88


12 Steps (QC):  88





Balance


Picking up an Object (QC):  88





Mental Status/Objective


Comprehension:  7


Expression:  7


Social Interaction:  7


Problem Solvin


Memory:  7





ADL-Treatment


Eating (QC):  6


Oral Hygiene (QC):  6


Shower/Bathe Self (QC):  4 (CGA on shower bench for dymnamic movements.)


Upper Body Dressing (QC):  5


Lower Body Dressing (QC):  4 (SBA and increased time, with use of AE to doff 

shorts and don shorts at bed level in supine position.)


On/Off Footwear (QC):  5


Toileting Hygiene (QC):  7


Toilet Transfer (QC):  7





Assessment/Plan


Assessment and Plan


Assess & Plan/Chief Complaint


Assessment:


s/p right total knee replacement POD # 10


h/o DVT


Spinal cord injury hx C4-C6 


Muscle spasms severe and incapacitating acute on chronic


Neurogenic bladder


Colon atony


GERD


Acute hyponatremia


DVT PPx with ASA and Lovenox





Plan:


Lovenox


DC Fluid restriction since sodium 135


Pain control


IRF protocol


Bowel and bladder management





(1) Status post right knee replacement


(2) History of spinal cord injury


(3) Muscle spasm


(4) Neurogenic bladder


(5) Colon atonic


(6) Muscle wasting


(7) Hyponatremia


(8) History of DVT (deep vein thrombosis)


(9) DVT prophylaxis


(10) Chronic GERD











EYAL NIELSEN DO                Apr 10, 2020 10:40

## 2020-04-10 NOTE — NUR
PT HAS F/U APPT W SURGEON, DR. JONI DE LOS SANTOS ON WED, 4/15 AT 10:15

-------------------------------------------------------------------------------

Addendum: 04/10/20 at 1838 by JD RONDON RN

-------------------------------------------------------------------------------

Amended: Links added.

## 2020-04-10 NOTE — NUR
Ax2 to transfer from w/c to bed. Pt states that he would like to wait an hour before getting 
the fleets enema.

## 2020-04-10 NOTE — OCCUPATIONAL THER DAILY NOTE
OT Current Status-Daily Note


Subjective


Pt in bed, agrees to therapy. States he recently had a pain pill. Did not rate 

pain.





ADL-Treatment


Therapy Code Descriptions/Definitions 





Functional [place] Measure:


0=Not Assessed/NA        4=Minimal Assistance


1=Total Assistance        5=Supervision or Setup


2=Maximal Assistance  6=Modified [place]


3=Moderate Assistance 7=Complete IndependenceSCALE: Activities may be completed 

with or without assistive devices.





6-Indepedent-patient completes the activity by him/herself with no assistance 

from a helper.


5-Set-up or Clean-up Assistance-helper sets up or cleans up; patient completes 

activity. [place] assists only prior to or  


    following the activity.


4-Supervision or Touching Assistance-helper provides verbal cues and/or 

touching/steadying and/or contact guard assistance as patient completes 

activity. Assistance may be provided   


    throughout the activity or intermittently.


3-Partial/Moderate Assistance-helper does LESS THAN HALF the effort. [place] 

lifts, holds or supports trunk or limbs, but provides less than half the effort.


2-Substantial/Maximal Assistance-helper does MORE THAN HALF the effort. [place] 

lifts or holds trunk or limbs and provides more than half the effort.


0-Risonvqld-dojfdf does ALL the effort. Patient does none of the effort to 

complete the activity. Or, the assistance of 2 or more helpers is required for 

the patient to complete the  


    activity.


If activity was not attempted, code reason:


7-Patient Refused.


9-Not Applicable-not attempted and the patient did not perform the activity 

before the current illness, exacerbation or injury.


10-Not Attempted due to Environmental Limitations-(lack of equipment, weather 

restraints, etc.).


88-Not Attempted due to Medical Conditions or Safety Concerns.





Other Treatment


Pt supine to sit with SBA and increased time using bed rail. Pt worked on 

functional transfers during session to increase independence and safety. Pt 

transferred EOB to w/c with CGA. Pt performed w/c mobility to shower room. Pt 

educated on transfer to tub transfer bench. Pt was able to complete scooting 

transfer w/c <-> transfer bench with min assist. Pt returned to room. Performed 

sit to stand with mod assist from w/c and transferred to recliner chair using 

FWW. Pt sitting in chair with needs met after session.





OT Short Term Goals


Short Term Goals


Time Frame:  2020


Eatin


Oral hygiene:  6


Toileting hygiene:  4


Shower/bathe self:  4


Upper body dressin


Lower body dressing:  3


Putting on/taking off footwear:  3





OT Long Term Goals


Long Term Goals


Time Frame:  2020


Eating (QC):  6


Oral Hygiene (QC):  6


Toileting Hygiene (QC):  6


Shower/Bathe Self (QC):  5


Upper Body Dressing (QC):  6


Lower Body Dressing (QC):  6


On/Off Footwear (QC):  6


Additional Goals:  1-Demonstrate ADL Tasks, 2-Verbalize Understanding, 3-

ImproveStrength/Kai


1=Demonstrate adherence to instructed precautions during ADL tasks.


2=Patient will verbalize/demonstrate understanding of assistive devices/modifica

tions for ADL.


3=Patient will improve strength/tolerance for activity to enable patient to 

perform ADL's.





OT Education/Plan


Discharge Recommendations


Plan/Recommendations:  Continue POC





Treatment Plan/Plan of Care


Patient would benefit from OT for education, treatment and training to promote 

independence in ADL's, mobility, safety and/or upper extremity function for 

ADL's.


Plan of Care:  ADL Retraining, Functional Mobility, Group Exercise/Act as Ind, 

UE Funct Exercise/Act


Treatment Duration:  2020


Frequency:  At least 5 of 7 days/Wk (IRF)


Estimated Hrs Per Day:  1.5 hours per day


Agreement:  Yes


Rehab Potential:  Good





Time/GCodes


Start Time:  11:30


Stop Time:  12:00


Total Time Billed (hr/min):  30


Billed Treatment Time


1 visit, FAx2(30minutes)











JOHNNY SMITH OT            Apr 10, 2020 12:54

## 2020-04-10 NOTE — NUR
Pt returned to his room, assisted to transfer to recliner, pt eating supper. States, "feels 
better after getting out side for a little while."

## 2020-04-11 VITALS — SYSTOLIC BLOOD PRESSURE: 101 MMHG | DIASTOLIC BLOOD PRESSURE: 67 MMHG

## 2020-04-11 VITALS — SYSTOLIC BLOOD PRESSURE: 119 MMHG | DIASTOLIC BLOOD PRESSURE: 76 MMHG

## 2020-04-11 RX ADMIN — POLYETHYLENE GLYCOL (3350) SCH GM: 17 POWDER, FOR SOLUTION ORAL at 20:26

## 2020-04-11 RX ADMIN — ASPIRIN 81 MG CHEWABLE TABLET SCH MG: 81 TABLET CHEWABLE at 08:24

## 2020-04-11 RX ADMIN — BACLOFEN SCH MG: 10 TABLET ORAL at 08:24

## 2020-04-11 RX ADMIN — ENOXAPARIN SODIUM SCH MG: 100 INJECTION SUBCUTANEOUS at 18:07

## 2020-04-11 RX ADMIN — BACLOFEN SCH MG: 10 TABLET ORAL at 20:24

## 2020-04-11 RX ADMIN — OXYCODONE HYDROCHLORIDE AND ACETAMINOPHEN PRN TAB: 5; 325 TABLET ORAL at 10:38

## 2020-04-11 RX ADMIN — POLYETHYLENE GLYCOL (3350) SCH GM: 17 POWDER, FOR SOLUTION ORAL at 08:24

## 2020-04-11 RX ADMIN — DOCUSATE SODIUM AND SENNOSIDES SCH EA: 8.6; 5 TABLET, FILM COATED ORAL at 08:24

## 2020-04-11 RX ADMIN — DOCUSATE SODIUM AND SENNOSIDES SCH EA: 8.6; 5 TABLET, FILM COATED ORAL at 20:24

## 2020-04-11 RX ADMIN — BACLOFEN SCH MG: 10 TABLET ORAL at 12:20

## 2020-04-11 RX ADMIN — GABAPENTIN SCH MG: 300 CAPSULE ORAL at 20:24

## 2020-04-11 RX ADMIN — OXYCODONE HYDROCHLORIDE AND ACETAMINOPHEN PRN TAB: 5; 325 TABLET ORAL at 20:26

## 2020-04-11 NOTE — NUR
TWO SMALL DARKENED AREAS ON RIGHT KNEE INCISION AND SHOWN TO DR. NIELSEN. SHE SENT PIC TO DR. DE LOS SANTOS. NO DRAINAGE. INCISION MILDLY RED.

## 2020-04-11 NOTE — OCCUPATIONAL THER DAILY NOTE
OT Current Status-Daily Note


Subjective


Pt resting in bed, agrees to treatment.





ADL-Treatment


Supine to sit with SBA using bed rail. Pt donned pullover shirt with set up. 

Dons socks with set up and increased time using sock aid. Declined to change 

shorts today. Transfer to w/c CGA. Pt completed grooming tasks seated at sink. 

Pt brushed teeth, combed hair, and washed face with modified independence. Pt 

practiced transfer to BSC. Pt able to complete transfer w/c<-> BSC with CGA. Pt 

performed w/c mobility around rehab unit and to therapy gym. Transferred w/c<-> 

mat with CGA. Pt performed sit to supine with assist for right LE. Pt required 

assist for supine to sit secondary to decreased core strength. Pt returned to 

room and completed sit to stand from w/c with mod assist. Transferred to 

recliner chair with FWW. Pt sitting in chair with needs met after session.


Therapy Code Descriptions/Definitions 





Functional [place] Measure:


0=Not Assessed/NA        4=Minimal Assistance


1=Total Assistance        5=Supervision or Setup


2=Maximal Assistance  6=Modified [place]


3=Moderate Assistance 7=Complete IndependenceSCALE: Activities may be completed 

with or without assistive devices.





6-Indepedent-patient completes the activity by him/herself with no assistance 

from a helper.


5-Set-up or Clean-up Assistance-helper sets up or cleans up; patient completes 

activity. [place] assists only prior to or  


    following the activity.


4-Supervision or Touching Assistance-helper provides verbal cues and/or 

touching/steadying and/or contact guard assistance as patient completes 

activity. Assistance may be provided   


    throughout the activity or intermittently.


3-Partial/Moderate Assistance-helper does LESS THAN HALF the effort. [place] 

lifts, holds or supports trunk or limbs, but provides less than half the effort.


2-Substantial/Maximal Assistance-helper does MORE THAN HALF the effort. [place] 

lifts or holds trunk or limbs and provides more than half the effort.


4-Bgvlnswbk-habvxj does ALL the effort. Patient does none of the effort to 

complete the activity. Or, the assistance of 2 or more helpers is required for 

the patient to complete the  


    activity.


If activity was not attempted, code reason:


7-Patient Refused.


9-Not Applicable-not attempted and the patient did not perform the activity 

before the current illness, exacerbation or injury.


10-Not Attempted due to Environmental Limitations-(lack of equipment, weather 

restraints, etc.).


88-Not Attempted due to Medical Conditions or Safety Concerns.


Oral Hygiene (QC):  6


Upper Body Dressing (QC):  5


On/Off Footwear:  5





OT Short Term Goals


Short Term Goals


Time Frame:  2020


Eatin


Oral hygiene:  6


Toileting hygiene:  4


Shower/bathe self:  4


Upper body dressin


Lower body dressing:  3


Putting on/taking off footwear:  3





OT Long Term Goals


Long Term Goals


Time Frame:  2020


Eating (QC):  6


Oral Hygiene (QC):  6


Toileting Hygiene (QC):  6


Shower/Bathe Self (QC):  5


Upper Body Dressing (QC):  6


Lower Body Dressing (QC):  6


On/Off Footwear (QC):  6


Additional Goals:  1-Demonstrate ADL Tasks, 2-Verbalize Understanding, 3-

ImproveStrength/Kai


1=Demonstrate adherence to instructed precautions during ADL tasks.


2=Patient will verbalize/demonstrate understanding of assistive 

devices/modifications for ADL.


3=Patient will improve strength/tolerance for activity to enable patient to 

perform ADL's.





OT Education/Plan


Discharge Recommendations


Plan/Recommendations:  Continue POC





Treatment Plan/Plan of Care


Patient would benefit from OT for education, treatment and training to promote 

independence in ADL's, mobility, safety and/or upper extremity function for 

ADL's.


Plan of Care:  ADL Retraining, Functional Mobility, Group Exercise/Act as Ind, 

UE Funct Exercise/Act


Treatment Duration:  2020


Frequency:  At least 5 of 7 days/Wk (IRF)


Estimated Hrs Per Day:  1.5 hours per day


Agreement:  Yes


Rehab Potential:  Good





Time/GCodes


Start Time:  07:40


Stop Time:  08:10


Total Time Billed (hr/min):  30


Billed Treatment Time


1 visit, ADLx2(30minutes)











JOHNNY SMITH OT            2020 08:22

## 2020-04-11 NOTE — PM&R PROGRESS NOTE
Subjective


HPI/CC On Admission


Date Seen by Provider:  2020


Time Seen by Provider:  12:20


Subjective/Events-last exam


Patient doing better each day


Updated Dr Feliciano and sent him a pic of his staples which look good and no 

evidence of any redness


Mentioned about ortho appt and will arrange after he is DC


DVT PPX with ASA and Lovenox since h/o DVT 1990


Got outside yesterday and felt really good about that


Percocet and Baclofen working very well for him


SSE cleared out constipation and completely evacuated bowels


Checked meds and labs


Reviewed therapy notes


Conferred with RN





Review of Systems


General:  Fatigue


Musculoskeletal:  leg pain


Neurological:  Numbness, Incoordination





Objective


Exam


Vital Signs





Vital Signs








  Date Time  Temp Pulse Resp B/P (MAP) Pulse Ox O2 Delivery O2 Flow Rate FiO2


 


20 16:11 36.2 79 16 101/67 (78) 98 Room Air  





Capillary Refill : Less Than 3 SecondsLess Than 3 Seconds


General Appearance:  No Apparent Distress, WD/WN


HEENT:  PERRL/EOMI, Normal ENT Inspection, Pharynx Normal


Neck:  Full Range of Motion, Normal Inspection, Non Tender, Supple, Carotid 

Bruit


Respiratory:  Chest Non Tender, Lungs Clear, Normal Breath Sounds, No Accessory 

Muscle Use, No Respiratory Distress


Cardiovascular:  Regular Rate, Rhythm, No Edema, No Gallop, No JVD, No Murmur, 

Normal Peripheral Pulses


Gastrointestinal:  Normal Bowel Sounds, No Organomegaly, No Pulsatile Mass, Non 

Tender, Soft


Back:  Normal Inspection, No CVA Tenderness, No Vertebral Tenderness, Decreased 

Range of Motion


Extremity:  Normal Capillary Refill, Normal Inspection, Normal Range of Motion 

(except right leg), Non Tender, No Calf Tenderness, No Pedal Edema


Neurologic/Psychiatric:  Alert, Oriented x3, Normal Mood/Affect, Motor Weakness 

(all extremities 4/5, muscle wasting upper extremities)


Skin:  Normal Color, Warm/Dry


Lymphatic:  No Adenopathy





Results/Procedures


Lab


Patient resulted labs reviewed.





FIM


Transfers


Therapy Code Descriptions/Definitions 





Functional [place] Measure:


0=Not Assessed/NA        4=Minimal Assistance


1=Total Assistance        5=Supervision or Setup


2=Maximal Assistance  6=Modified [place]


3=Moderate Assistance 7=Complete IndependenceSCALE: Activities may be completed 

with or without assistive devices.





6-Indepedent-patient completes the activity by him/herself with no assistance 

from a helper.


5-Set-up or Clean-up Assistance-helper sets up or cleans up; patient completes 

activity. [place] assists only prior to or  


    following the activity.


4-Supervision or Touching Assistance-helper provides verbal cues and/or 

touching/steadying and/or contact guard assistance as patient completes 

activity. Assistance may be provided   


    throughout the activity or intermittently.


3-Partial/Moderate Assistance-helper does LESS THAN HALF the effort. [place] 

lifts, holds or supports trunk or limbs, but provides less than half the effort.


2-Substantial/Maximal Assistance-helper does MORE THAN HALF the effort. [place] 

lifts or holds trunk or limbs and provides more than half the effort.


8-Gtfcctaqk-vnlbjo does ALL the effort. Patient does none of the effort to 

complete the activity. Or, the assistance of 2 or more helpers is required for 

the patient to complete the  


    activity.


If activity was not attempted, code reason:


7-Patient Refused.


9-Not Applicable-not attempted and the patient did not perform the activity b

efore the current illness, exacerbation or injury.


10-Not Attempted due to Environmental Limitations-(lack of equipment, weather 

restraints, etc.).


88-Not Attempted due to Medical Conditions or Safety Concerns.


Roll Left to Right (QC):  5


Sit to Lying (QC):  3


Sit to Stand (QC):  3 (min to CGA to stand; pt able to position his feet without

cues and correct hand placement without cues. )


Chair/Bed-to-Chair Xfer(QC):  3


Car Transfer (QC):  2





Gait Training


Does the Patient Walk?:  Yes


Distance:  150 ft, 30 ft, 50 ft


Walk 10 feet (QC):  4


Walk 50 ft with 2 Turns(QC):  4


Walk 150 ft (QC):  88


Walking 10ft/uneven surface-QC:  88


Gait Persons Needed:  1


Gait Assistive Device:  FWW





Wheelchair Training


Does the Pt Use a Wheelchair?:  Yes


Distance:  100'


Wheel 50 ft with 2 turns (QC):  6


Wheel 150 ft (QC):  6


Type of Wheelchair:  Manual





Stair Training


1 Step (curb) (QC):  88


4 Steps (QC):  88


12 Steps (QC):  88





Balance


Picking up an Object (QC):  88





Mental Status/Objective


Comprehension:  7


Expression:  7


Social Interaction:  7


Problem Solvin


Memory:  7





ADL-Treatment


Eating (QC):  6


Oral Hygiene (QC):  6


Shower/Bathe Self (QC):  4 (CGA on shower bench for dymnamic movements.)


Upper Body Dressing (QC):  5


Lower Body Dressing (QC):  4 (SBA and increased time, with use of AE to doff 

shorts and don shorts at bed level in supine position.)


On/Off Footwear (QC):  5


Toileting Hygiene (QC):  7


Toilet Transfer (QC):  7





Assessment/Plan


Assessment and Plan


Assess & Plan/Chief Complaint


Assessment:


s/p right total knee replacement POD # 11


h/o DVT


Spinal cord injury hx C4-C6 


Muscle spasms severe and incapacitating acute on chronic


Neurogenic bladder


Colon atony


GERD


Acute hyponatremia


DVT PPx with ASA and Lovenox





Plan:


Lovenox


DC Fluid restriction since sodium 135


Pain control


IRF protocol


Bowel and bladder management


Ortho appt upon DC





(1) Status post right knee replacement


(2) History of spinal cord injury


(3) Muscle spasm


(4) Neurogenic bladder


(5) Colon atonic


(6) Muscle wasting


(7) Hyponatremia


(8) History of DVT (deep vein thrombosis)


(9) DVT prophylaxis


(10) Chronic GERD











EYAL NIELSEN DO                2020 14:44

## 2020-04-11 NOTE — NUR
NAPPED AWHILE THIS AFTERNOON, BUT UP IN CHAIR MOST OF DAY. POOR OUTPUT TODAY, BUT VOIDED 
OVER 1 LITER LAST NIGHT. WILL CONTINUE TO MONITOR. RIGHT LEG IMMOBILIZER ON THIS AFTERNOON.

## 2020-04-11 NOTE — NUR
STATES WORE IMMOBILIZER ON RIGHT LEG ALL NIGHT. STATES FRUSTRATION BECAUSE UNABLE TO 
STRAIGHTEN RIGHT LEG OUT ALL THE WAY. EXTENSION EXERCISED DONE TO RIGHT KNEE. HAS SPASTIC 
MOVEMENTS OUT OF THAT LEG. STATES RIGHT KNEE/HIP PAIN WELL CONTROLLED WITH PERCOCET.

## 2020-04-12 VITALS — SYSTOLIC BLOOD PRESSURE: 143 MMHG | DIASTOLIC BLOOD PRESSURE: 68 MMHG

## 2020-04-12 VITALS — DIASTOLIC BLOOD PRESSURE: 74 MMHG | SYSTOLIC BLOOD PRESSURE: 110 MMHG

## 2020-04-12 RX ADMIN — GABAPENTIN SCH MG: 300 CAPSULE ORAL at 20:02

## 2020-04-12 RX ADMIN — ASPIRIN 81 MG CHEWABLE TABLET SCH MG: 81 TABLET CHEWABLE at 09:12

## 2020-04-12 RX ADMIN — BACLOFEN SCH MG: 10 TABLET ORAL at 12:45

## 2020-04-12 RX ADMIN — DOCUSATE SODIUM AND SENNOSIDES SCH EA: 8.6; 5 TABLET, FILM COATED ORAL at 20:02

## 2020-04-12 RX ADMIN — BACLOFEN SCH MG: 10 TABLET ORAL at 20:02

## 2020-04-12 RX ADMIN — ENOXAPARIN SODIUM SCH MG: 100 INJECTION SUBCUTANEOUS at 18:04

## 2020-04-12 RX ADMIN — OXYCODONE HYDROCHLORIDE AND ACETAMINOPHEN PRN TAB: 5; 325 TABLET ORAL at 10:42

## 2020-04-12 RX ADMIN — BACLOFEN SCH MG: 10 TABLET ORAL at 09:12

## 2020-04-12 RX ADMIN — DOCUSATE SODIUM AND SENNOSIDES SCH EA: 8.6; 5 TABLET, FILM COATED ORAL at 09:12

## 2020-04-12 RX ADMIN — OXYCODONE HYDROCHLORIDE AND ACETAMINOPHEN PRN TAB: 5; 325 TABLET ORAL at 20:02

## 2020-04-12 RX ADMIN — POLYETHYLENE GLYCOL (3350) SCH GM: 17 POWDER, FOR SOLUTION ORAL at 20:15

## 2020-04-12 RX ADMIN — POLYETHYLENE GLYCOL (3350) SCH GM: 17 POWDER, FOR SOLUTION ORAL at 09:12

## 2020-04-12 NOTE — PM&R PROGRESS NOTE
Subjective


HPI/CC On Admission


Date Seen by Provider:  2020


Time Seen by Provider:  13:00


Subjective/Events-last exam


Patient doing better each day


Updated Dr Feliciano on regular basis since admit


Ortho appt Wednesday


DVT PPX with ASA and Lovenox since h/o DVT 1990


Wants to go outside again today if possible


Percocet and Baclofen working very well for him


SSE cleared out constipation and completely evacuated bowels Friday night 

maintained on laxatives


Checked meds and labs


Reviewed therapy notes


Conferred with RN





Review of Systems


General:  Fatigue


Musculoskeletal:  leg pain


Neurological:  Weakness, Numbness, Incoordination





Objective


Exam


Vital Signs





Vital Signs








  Date Time  Temp Pulse Resp B/P (MAP) Pulse Ox O2 Delivery O2 Flow Rate FiO2


 


20 17:14 36.4 75 18 110/74 (86) 98 Room Air  





Capillary Refill : Less Than 3 SecondsLess Than 3 Seconds


General Appearance:  No Apparent Distress, WD/WN


HEENT:  PERRL/EOMI, Normal ENT Inspection, Pharynx Normal


Neck:  Full Range of Motion, Normal Inspection, Non Tender, Supple, Carotid 

Bruit


Respiratory:  Chest Non Tender, Lungs Clear, Normal Breath Sounds, No Accessory 

Muscle Use, No Respiratory Distress


Cardiovascular:  Regular Rate, Rhythm, No Edema, No Gallop, No JVD, No Murmur, 

Normal Peripheral Pulses


Gastrointestinal:  Normal Bowel Sounds, No Organomegaly, No Pulsatile Mass, Non 

Tender, Soft


Back:  Normal Inspection, No CVA Tenderness, No Vertebral Tenderness, Decreased 

Range of Motion


Extremity:  Normal Capillary Refill, Normal Inspection, Normal Range of Motion 

(except right leg), Non Tender, No Calf Tenderness, No Pedal Edema


Neurologic/Psychiatric:  Alert, Oriented x3, Normal Mood/Affect, Motor Weakness 

(all extremities 4/5, muscle wasting upper extremities)


Skin:  Normal Color, Warm/Dry


Lymphatic:  No Adenopathy





Results/Procedures


Lab


Patient resulted labs reviewed.





FIM


Transfers


Therapy Code Descriptions/Definitions 





Functional [place] Measure:


0=Not Assessed/NA        4=Minimal Assistance


1=Total Assistance        5=Supervision or Setup


2=Maximal Assistance  6=Modified [place]


3=Moderate Assistance 7=Complete IndependenceSCALE: Activities may be completed 

with or without assistive devices.





6-Indepedent-patient completes the activity by him/herself with no assistance 

from a helper.


5-Set-up or Clean-up Assistance-helper sets up or cleans up; patient completes 

activity. [place] assists only prior to or  


    following the activity.


4-Supervision or Touching Assistance-helper provides verbal cues and/or 

touching/steadying and/or contact guard assistance as patient completes 

activity. Assistance may be provided   


    throughout the activity or intermittently.


3-Partial/Moderate Assistance-helper does LESS THAN HALF the effort. [place] 

lifts, holds or supports trunk or limbs, but provides less than half the effort.


2-Substantial/Maximal Assistance-helper does MORE THAN HALF the effort. [place] 

lifts or holds trunk or limbs and provides more than half the effort.


6-Lnbtiaaux-rokxjm does ALL the effort. Patient does none of the effort to 

complete the activity. Or, the assistance of 2 or more helpers is required for 

the patient to complete the  


    activity.


If activity was not attempted, code reason:


7-Patient Refused.


9-Not Applicable-not attempted and the patient did not perform the activity 

before the current illness, exacerbation or injury.


10-Not Attempted due to Environmental Limitations-(lack of equipment, weather 

restraints, etc.).


88-Not Attempted due to Medical Conditions or Safety Concerns.


Roll Left to Right (QC):  5


Sit to Lying (QC):  3


Sit to Stand (QC):  3 (min to CGA to stand; pt able to position his feet without

cues and correct hand placement without cues. )


Chair/Bed-to-Chair Xfer(QC):  3


Car Transfer (QC):  2





Gait Training


Does the Patient Walk?:  Yes


Distance:  150 ft, 30 ft, 50 ft


Walk 10 feet (QC):  4


Walk 50 ft with 2 Turns(QC):  4


Walk 150 ft (QC):  88


Walking 10ft/uneven surface-QC:  88


Gait Persons Needed:  1


Gait Assistive Device:  FWW





Wheelchair Training


Does the Pt Use a Wheelchair?:  Yes


Distance:  100'


Wheel 50 ft with 2 turns (QC):  6


Wheel 150 ft (QC):  6


Type of Wheelchair:  Manual





Stair Training


1 Step (curb) (QC):  88


4 Steps (QC):  88


12 Steps (QC):  88





Balance


Picking up an Object (QC):  88





Mental Status/Objective


Comprehension:  7


Expression:  7


Social Interaction:  7


Problem Solvin


Memory:  7





ADL-Treatment


Eating (QC):  6


Oral Hygiene (QC):  6


Shower/Bathe Self (QC):  4 (CGA on shower bench for dymnamic movements.)


Upper Body Dressing (QC):  5


Lower Body Dressing (QC):  4 (SBA and increased time, with use of AE to doff 

shorts and don shorts at bed level in supine position.)


On/Off Footwear (QC):  5


Toileting Hygiene (QC):  7


Toilet Transfer (QC):  7





Assessment/Plan


Assessment and Plan


Assess & Plan/Chief Complaint


Assessment:


s/p right total knee replacement POD # 12


h/o DVT


Spinal cord injury hx C4-C6 


Muscle spasms severe and incapacitating acute on chronic


Neurogenic bladder


Colon atony


GERD


Acute hyponatremia


DVT PPx with ASA and Lovenox





Plan:


Lovenox


DC Fluid restriction since sodium 135


Pain control


IRF protocol


Bowel and bladder management


Ortho appt upon DC





(1) Status post right knee replacement


(2) History of spinal cord injury


(3) Muscle spasm


(4) Neurogenic bladder


(5) Colon atonic


(6) Muscle wasting


(7) Hyponatremia


(8) History of DVT (deep vein thrombosis)


(9) DVT prophylaxis


(10) Chronic GERD











EYAL NIELSEN DO                2020 12:59

## 2020-04-12 NOTE — NUR
DENIES PAIN. STATES FEELS IT MAY BE A LITTLE EASIER TODAY IN GETTING UP. STILL ONLY ABLE TO 
BEAR A LITTLE WEIGHT ON RIGHT LEG.

## 2020-04-13 VITALS — DIASTOLIC BLOOD PRESSURE: 71 MMHG | SYSTOLIC BLOOD PRESSURE: 124 MMHG

## 2020-04-13 VITALS — SYSTOLIC BLOOD PRESSURE: 105 MMHG | DIASTOLIC BLOOD PRESSURE: 69 MMHG

## 2020-04-13 LAB
ALBUMIN SERPL-MCNC: 3.3 GM/DL (ref 3.2–4.5)
ALP SERPL-CCNC: 65 U/L (ref 40–136)
ALT SERPL-CCNC: 32 U/L (ref 0–55)
BASOPHILS # BLD AUTO: 0 10^3/UL (ref 0–0.1)
BASOPHILS NFR BLD AUTO: 0 % (ref 0–10)
BILIRUB SERPL-MCNC: 0.3 MG/DL (ref 0.1–1)
BUN/CREAT SERPL: 17
CALCIUM SERPL-MCNC: 9.3 MG/DL (ref 8.5–10.1)
CHLORIDE SERPL-SCNC: 103 MMOL/L (ref 98–107)
CO2 SERPL-SCNC: 25 MMOL/L (ref 21–32)
CREAT SERPL-MCNC: 0.76 MG/DL (ref 0.6–1.3)
EOSINOPHIL # BLD AUTO: 0.1 10^3/UL (ref 0–0.3)
EOSINOPHIL NFR BLD AUTO: 1 % (ref 0–10)
ERYTHROCYTE [DISTWIDTH] IN BLOOD BY AUTOMATED COUNT: 12.8 % (ref 10–14.5)
GFR SERPLBLD BASED ON 1.73 SQ M-ARVRAT: > 60 ML/MIN
GLUCOSE SERPL-MCNC: 82 MG/DL (ref 70–105)
HCT VFR BLD CALC: 35 % (ref 40–54)
HGB BLD-MCNC: 11.6 G/DL (ref 13.3–17.7)
LYMPHOCYTES # BLD AUTO: 1.5 X 10^3 (ref 1–4)
LYMPHOCYTES NFR BLD AUTO: 17 % (ref 12–44)
MANUAL DIFFERENTIAL PERFORMED BLD QL: NO
MCH RBC QN AUTO: 32 PG (ref 25–34)
MCHC RBC AUTO-ENTMCNC: 33 G/DL (ref 32–36)
MCV RBC AUTO: 99 FL (ref 80–99)
MONOCYTES # BLD AUTO: 0.7 X 10^3 (ref 0–1)
MONOCYTES NFR BLD AUTO: 7 % (ref 0–12)
NEUTROPHILS # BLD AUTO: 6.7 X 10^3 (ref 1.8–7.8)
NEUTROPHILS NFR BLD AUTO: 75 % (ref 42–75)
PLATELET # BLD: 442 10^3/UL (ref 130–400)
PMV BLD AUTO: 9.6 FL (ref 7.4–10.4)
POTASSIUM SERPL-SCNC: 4.4 MMOL/L (ref 3.6–5)
PROT SERPL-MCNC: 6.7 GM/DL (ref 6.4–8.2)
SODIUM SERPL-SCNC: 138 MMOL/L (ref 135–145)
WBC # BLD AUTO: 9 10^3/UL (ref 4.3–11)

## 2020-04-13 RX ADMIN — BACLOFEN SCH MG: 10 TABLET ORAL at 20:15

## 2020-04-13 RX ADMIN — BISACODYL PRN MG: 10 SUPPOSITORY RECTAL at 14:10

## 2020-04-13 RX ADMIN — BACLOFEN SCH MG: 10 TABLET ORAL at 12:35

## 2020-04-13 RX ADMIN — APIXABAN SCH MG: 2.5 TABLET, FILM COATED ORAL at 20:15

## 2020-04-13 RX ADMIN — DOCUSATE SODIUM AND SENNOSIDES SCH EA: 8.6; 5 TABLET, FILM COATED ORAL at 08:12

## 2020-04-13 RX ADMIN — OXYCODONE HYDROCHLORIDE AND ACETAMINOPHEN PRN TAB: 5; 325 TABLET ORAL at 20:15

## 2020-04-13 RX ADMIN — ASPIRIN 81 MG CHEWABLE TABLET SCH MG: 81 TABLET CHEWABLE at 08:13

## 2020-04-13 RX ADMIN — DOCUSATE SODIUM AND SENNOSIDES SCH EA: 8.6; 5 TABLET, FILM COATED ORAL at 20:16

## 2020-04-13 RX ADMIN — OXYCODONE HYDROCHLORIDE AND ACETAMINOPHEN PRN TAB: 5; 325 TABLET ORAL at 14:10

## 2020-04-13 RX ADMIN — POLYETHYLENE GLYCOL (3350) SCH GM: 17 POWDER, FOR SOLUTION ORAL at 08:25

## 2020-04-13 RX ADMIN — GABAPENTIN SCH MG: 300 CAPSULE ORAL at 20:15

## 2020-04-13 RX ADMIN — POLYETHYLENE GLYCOL (3350) SCH GM: 17 POWDER, FOR SOLUTION ORAL at 21:55

## 2020-04-13 RX ADMIN — BACLOFEN SCH MG: 10 TABLET ORAL at 08:13

## 2020-04-13 RX ADMIN — POLYETHYLENE GLYCOL (3350) SCH GM: 17 POWDER, FOR SOLUTION ORAL at 08:22

## 2020-04-13 NOTE — OCCUPATIONAL THER DAILY NOTE
OT Current Status-Daily Note


Subjective


Pt. reports tightness in right hamstring.  No pain level reported.





Appearance


Pt. up in wheelchair when OT entered room.  Agrees to work with OT.





Mental Status/Objective


Patient Orientation:  Person, Place, Time, Situation





ADL-Treatment


Therapy Code Descriptions/Definitions 





Functional [place] Measure:


0=Not Assessed/NA        4=Minimal Assistance


1=Total Assistance        5=Supervision or Setup


2=Maximal Assistance  6=Modified [place]


3=Moderate Assistance 7=Complete IndependenceSCALE: Activities may be completed 

with or without assistive devices.





6-Indepedent-patient completes the activity by him/herself with no assistance 

from a helper.


5-Set-up or Clean-up Assistance-helper sets up or cleans up; patient completes 

activity. [place] assists only prior to or  


    following the activity.


4-Supervision or Touching Assistance-helper provides verbal cues and/or 

touching/steadying and/or contact guard assistance as patient completes 

activity. Assistance may be provided   


    throughout the activity or intermittently.


3-Partial/Moderate Assistance-helper does LESS THAN HALF the effort. [place] 

lifts, holds or supports trunk or limbs, but provides less than half the effort.


2-Substantial/Maximal Assistance-helper does MORE THAN HALF the effort. [place] 

lifts or holds trunk or limbs and provides more than half the effort.


6-Wutblypkf-dkqxau does ALL the effort. Patient does none of the effort to 

complete the activity. Or, the assistance of 2 or more helpers is required for 

the patient to complete the  


    activity.


If activity was not attempted, code reason:


7-Patient Refused.


9-Not Applicable-not attempted and the patient did not perform the activity 

before the current illness, exacerbation or injury.


10-Not Attempted due to Environmental Limitations-(lack of equipment, weather 

restraints, etc.).


88-Not Attempted due to Medical Conditions or Safety Concerns.


Oral Hygiene (QC):  6 (Mod I at sink at wheelchair level.)


Shower/Bathe Self (QC):  4 (SBA seated on shower chair.)


Upper Body Dressing (QC):  5


Lower Body Dressing (QC):  3 (Pt. able to don shorts over feet and up to thighs.

 Pt. lifted self in wheelchair, in wheelchair push up position, while OT donned 

shorts over hips.)


On/Off Footwear:  4 (SBA to doff/don slipper socks with AE.)


Pt. utilized large shower room.  Transferred from wheelchair-shower bench with 

min assist in sliding motion.  Pt. able to undress while seated, including 

doffing shorts over hips.  Pt. utilized AE.  After shower, pt dried and then 

donned all clothing, except for shorts over hips.  Transferred back to 

wheelchair with mod assist in stand/pivot motion.  Pt. pushed up from wheelchair

while OT donned over hips.  Pt. self propelled to therapy gym and completed 10 

minutes on arm bike to increase overall strength/endurance.  Propelled to 

parallel bar and worked on standing x 2, with min assist for sit-stand at bar.  

Pt. unable to fully bear weight on right LE due to increased hamstring 

tightness/spasms.  Pt. unable to extend right knee.  Pt. transferred back to 

wheelchair and propelled back to room.  All needs met in room.





Education


OT Patient Education:  Correct positioning, Exercise program, Modified ADL 

techniques, Progress toward Goal/Update tx plan, Purpose of tx/functional 

activities, Reviewed precautions, Rehab process


Teaching Recipient:  Patient


Teaching Methods:  Demonstration, Discussion


Response to Teaching:  Verbalize Understanding, Return Demonstration





OT Short Term Goals


Short Term Goals


Time Frame:  2020


Eatin


Oral hygiene:  6


Toileting hygiene:  4


Shower/bathe self:  4


Upper body dressin


Lower body dressing:  3


Putting on/taking off footwear:  3





OT Long Term Goals


Long Term Goals


Time Frame:  2020


Eating (QC):  6


Oral Hygiene (QC):  6


Toileting Hygiene (QC):  6


Shower/Bathe Self (QC):  5


Upper Body Dressing (QC):  6


Lower Body Dressing (QC):  6


On/Off Footwear (QC):  6


Additional Goals:  1-Demonstrate ADL Tasks, 2-Verbalize Understanding, 3-

ImproveStrength/Kai


1=Demonstrate adherence to instructed precautions during ADL tasks.


2=Patient will verbalize/demonstrate understanding of assistive 

devices/modifications for ADL.


3=Patient will improve strength/tolerance for activity to enable patient to 

perform ADL's.





OT Education/Plan


Problem List/Assessment


Assessment:  Decreased Activ Tolerance, Impaired I ADL's, Impaired Self-Care 

Skills





Discharge Recommendations


Plan/Recommendations:  Continue POC


Therapy Discharge Recommendati:  Home & Family, Post Acute OT


Equpiment Recommendations-D/C:  Extended Bath Bench, Hip Kit





Treatment Plan/Plan of Care


Treatment,Training & Education:  Yes


Patient would benefit from OT for education, treatment and training to promote 

independence in ADL's, mobility, safety and/or upper extremity function for 

ADL's.


Plan of Care:  ADL Retraining, Functional Mobility, Group Exercise/Act as Ind, 

UE Funct Exercise/Act


Treatment Duration:  2020


Frequency:  At least 5 of 7 days/Wk (IRF)


Estimated Hrs Per Day:  1.5 hours per day


Agreement:  Yes


Rehab Potential:  Good





Time/GCodes


Start Time:  10:20


Stop Time:  11:50


Total Time Billed (hr/min):  90


Billed Treatment Time


1, ADL x 60minutes, Ex x 15minutes, FA x 15minutes











LILIANA SIMPSON OT           2020 13:32

## 2020-04-13 NOTE — NUR
CM/SS CONCURRENT DOCUMENTATION



Patient has ortho followup appointment Wednesday 4/15/20.  Discussed with patient about 
discharging to home that date.  He does not have his ramp built yet at his home, he will 
explore with his employer about timeframe for completion.



Patient indicated much difficulty accessing vehicle when his family transported him from 
[place] to [facility].  [facility] team can assist getting in car and appointment hospital can assist 
getting out.  All plans to be determined depending on ramp and wheelchair for home use.

## 2020-04-13 NOTE — PM&R PROGRESS NOTE
Subjective


HPI/CC On Admission


Date Seen by Provider:  2020


Time Seen by Provider:  10:45


Subjective/Events-last exam


Patient doing better each day


Appt Dr Feliciano on Wed and he may be able to DC prior to that appt then go home 

from his appt that day


Staples in place and doing well


DVT PPX with ASA and Lovenox since h/o DVT  so I changed to Eliquis for at 

least 1 month and DC Lovenox and he was updated


Wants to go outside again today if possible


Percocet and Baclofen working very well for him


SSE cleared out constipation and completely evacuated bowels Friday night 

maintained on laxatives but he may need this again


Checked meds and labs


Reviewed therapy notes


Conferred with RN





Review of Systems


Gastrointestinal:  Constipation


Musculoskeletal:  leg pain


Neurological:  Weakness, Numbness, Incoordination





Objective


Exam


Vital Signs





Vital Signs








  Date Time  Temp Pulse Resp B/P (MAP) Pulse Ox O2 Delivery O2 Flow Rate FiO2


 


20 06:00 36.5 69 16 124/71 (88) 98 Room Air  





Capillary Refill : Less Than 3 SecondsLess Than 3 Seconds


General Appearance:  No Apparent Distress, WD/WN


HEENT:  PERRL/EOMI, Normal ENT Inspection, Pharynx Normal


Neck:  Full Range of Motion, Normal Inspection, Non Tender, Supple, Carotid 

Bruit


Respiratory:  Chest Non Tender, Lungs Clear, Normal Breath Sounds, No Accessory 

Muscle Use, No Respiratory Distress


Cardiovascular:  Regular Rate, Rhythm, No Edema, No Gallop, No JVD, No Murmur, 

Normal Peripheral Pulses


Gastrointestinal:  Normal Bowel Sounds, No Organomegaly, No Pulsatile Mass, Non 

Tender, Soft


Back:  Normal Inspection, No CVA Tenderness, No Vertebral Tenderness, Decreased 

Range of Motion


Extremity:  Normal Capillary Refill, Normal Inspection, Normal Range of Motion 

(except right leg), Non Tender, No Calf Tenderness, No Pedal Edema


Neurologic/Psychiatric:  Alert, Oriented x3, Normal Mood/Affect, Motor Weakness 

(all extremities 4/5, muscle wasting upper extremities)


Skin:  Normal Color, Warm/Dry


Lymphatic:  No Adenopathy





Results/Procedures


Lab


Laboratory Tests


20 06:10








Patient resulted labs reviewed.





FIM


Transfers


Therapy Code Descriptions/Definitions 





Functional [place] Measure:


0=Not Assessed/NA        4=Minimal Assistance


1=Total Assistance        5=Supervision or Setup


2=Maximal Assistance  6=Modified [place]


3=Moderate Assistance 7=Complete IndependenceSCALE: Activities may be completed 

with or without assistive devices.





6-Indepedent-patient completes the activity by him/herself with no assistance 

from a helper.


5-Set-up or Clean-up Assistance-helper sets up or cleans up; patient completes 

activity. [place] assists only prior to or  


    following the activity.


4-Supervision or Touching Assistance-helper provides verbal cues and/or 

touching/steadying and/or contact guard assistance as patient completes 

activity. Assistance may be provided   


    throughout the activity or intermittently.


3-Partial/Moderate Assistance-helper does LESS THAN HALF the effort. [place] 

lifts, holds or supports trunk or limbs, but provides less than half the effort.


2-Substantial/Maximal Assistance-helper does MORE THAN HALF the effort. [place] 

lifts or holds trunk or limbs and provides more than half the effort.


0-Yhtrsivyk-bdhbwv does ALL the effort. Patient does none of the effort to 

complete the activity. Or, the assistance of 2 or more helpers is required for 

the patient to complete the  


    activity.


If activity was not attempted, code reason:


7-Patient Refused.


9-Not Applicable-not attempted and the patient did not perform the activity 

before the current illness, exacerbation or injury.


10-Not Attempted due to Environmental Limitations-(lack of equipment, weather 

restraints, etc.).


88-Not Attempted due to Medical Conditions or Safety Concerns.


Roll Left to Right (QC):  4


Sit to Lying (QC):  4


Sit to Stand (QC):  4


Chair/Bed-to-Chair Xfer(QC):  4


Car Transfer (QC):  2





Gait Training


Does the Patient Walk?:  Yes


Distance:  150 ft, 30 ft, 50 ft


Walk 10 feet (QC):  4


Walk 50 ft with 2 Turns(QC):  4


Walk 150 ft (QC):  88


Walking 10ft/uneven surface-QC:  88


Gait Persons Needed:  1


Gait Assistive Device:  FWW





Wheelchair Training


Does the Pt Use a Wheelchair?:  Yes


Distance:  100'


Wheel 50 ft with 2 turns (QC):  6


Wheel 150 ft (QC):  6


Type of Wheelchair:  Manual





Stair Training


1 Step (curb) (QC):  88


4 Steps (QC):  88


12 Steps (QC):  88





Balance


Picking up an Object (QC):  88





Mental Status/Objective


Comprehension:  7


Expression:  7


Social Interaction:  7


Problem Solvin


Memory:  7





ADL-Treatment


Eating (QC):  6


Oral Hygiene (QC):  6


Shower/Bathe Self (QC):  4 (CGA on shower bench for dymnamic movements.)


Upper Body Dressing (QC):  5


Lower Body Dressing (QC):  4 (SBA and increased time, with use of AE to doff 

shorts and don shorts at bed level in supine position.)


On/Off Footwear (QC):  5


Toileting Hygiene (QC):  7


Toilet Transfer (QC):  7





Assessment/Plan


Assessment and Plan


Assess & Plan/Chief Complaint


Assessment:


s/p right total knee replacement POD # 13


h/o DVT


Spinal cord injury hx C4-C6 


Muscle spasms severe and incapacitating acute on chronic


Neurogenic bladder


Colon atony


GERD


Acute hyponatremia


DVT PPx with ASA and Lovenox





Plan:


Lovenox transitioned to OAC for DVT PPx


Wheelchair mobility along with walking as much as possible


Pain control


IRF protocol


Bowel and bladder management


Ortho appt upon DC





(1) Status post right knee replacement


(2) History of spinal cord injury


(3) Muscle spasm


(4) Neurogenic bladder


(5) Colon atonic


(6) Muscle wasting


(7) Hyponatremia


(8) History of DVT (deep vein thrombosis)


(9) DVT prophylaxis


(10) Chronic GERD











EYAL NIELSEN DO                2020 10:35

## 2020-04-13 NOTE — PHYSICAL THERAPY DAILY NOTE
PT Daily Note-Current


Subjective


Pt. states he is not in any real pain.  States he kind of wishes now he had not 

had this done and was warned about a possible less than desirable outcome.  Pt. 

states there is no way he could attempt stairs right now and is considering 

having a ramp installed in his garage where the 3 steps are that he has to 

negotiate.  Pt. also states he usually sleeps on the floor on a mat at home  

because he needs a firm surface he can get leverage on to move and roll about.  

Pt. states he knows he wont be able to manage this now and is unsure what his 

bed plan will be at home now





Pain





   Location:  No Pain Reported





Mental Status


Patient Orientation:  Normal For Age





Transfers


SCALE: Activities may be completed with or without assistive devices.





6-Indepedent-patient completes the activity by him/herself with no assistance 

from a helper.


5-Set-up or Clean-up Assistance-helper sets up or cleans up; patient completes 

activity. [place] assists only prior to or  


    following the activity.


4-Supervision or Touching Assistance-helper provides verbal cues and/or 

touching/steadying and/or contact guard assistance as patient completes 

activity. Assistance may be provided   


    throughout the activity or intermittently.


3-Partial/Moderate Assistance-helper does LESS THAN HALF the effort. [place] 

lifts, holds or supports trunk or limbs, but provides less than half the effort.


2-Substantial/Maximal Assistance-helper does MORE THAN HALF the effort. [place] 

lifts or holds trunk or limbs and provides more than half the effort.


4-Xxdasgjnh-mttqlh does ALL the effort. Patient does none of the effort to 

complete the activity. Or, the assistance of 2 or more helpers is required for 

the patient to complete the  


    activity.


If activity was not attempted, code reason:


7-Patient Refused.


9-Not Applicable-not attempted and the patient did not perform the activity 

before the current illness, exacerbation or injury.


10-Not Attempted due to Environmental Limitations-(lack of equipment, weather 

restraints, etc.).


88-Not Attempted due to Medical Conditions or Safety Concerns.


Roll Left & Right (QC):  4


Sit to Lying (QC):  4


Lying to Sitting/Side of Bed(Q:  3


Sit to Stand (QC):  4


Chair/Bed-to-Chair Xfer(QC):  4





Weight Bearing


Right Lower Extremity:  Right


Weight Bearing/Tolerated





Gait Training


Does the Patient Walk?:  Yes


Walk 10 feet (QC):  4


Walk 50 ft with 2 Turns(QC):  4


Gait Persons Needed:  1


Gait Assistive Device:  FWW


w/c to follow, narrow scissored gait, with right heel not touching floor and 

knee flexed, heavy wt bearing in UEs and labored gait





Wheelchair Training


Does the Pt Use a Wheelchair?:  Yes


Wheel 50 ft with 2 turns (QC):  6


Wheel 150 ft (QC):  6


Type of Wheelchair:  Manual


manages turns and manuevers well for all





Exercises


Supine Ex:  Ankle pumps (HC stretches R 4 x 20 sec), Quad Set, Glut sets, 

Scooting, Straight leg raise (attempted with neurospasms limiting), Hip abd/add 

(stretches)


Supine Reps:  12


Seated Therapy Exercises:  Sit to stand


Seated Reps:  5


NuStep Minutes:  10


 NuStep Workload:  4





Neuromuscular


also leg presses on nustep x 12





Treatments


moist heat on posterior aspect right knee with one on one monitoring to attempt 

stretch with no real results noted to help improve extension etc





Assessment


Current Status:  Fair Progress


pts neurological spastic response to stretch and exercise limits progress.





PT Short Term Goals


Short Term Goals


Time Frame:  2020


Roll Left & Right:  4


Sit to lyin


Lying to sitting on side of be:  3


Sit to stand:  3


Chair/bed-to-chair transfer:  4


Walk 10 feet:  4


Walk 50 feet with two turns:  4





PT Long Term Goals


Long Term Goals


PT Long Term Goals Time Frame:  2020


Roll Left & Right (QC):  4 (SBA)


Sit to Lying (QC):  4 (SBA)


Lying-Sitting on Side/Bed(QC):  4 (SBA)


Sit to Stand (QC):  3 (Era)


Chair/Bed-to-Chair Xfer(QC):  4 (SBA)


Toilet Transfer (QC):  3 (Era)


Car Transfer (QC):  3 (Era)


Does the Patient Walk:  Yes


Walk 10 feet (QC):  4 (SBA)


Walk 50ft with 2 Turns (QC):  4 (SBA)


Walk 150 ft (QC):  4 (SBA)


Walking 10ft on Uneven Surface:  88


1 Step (curb) (QC):  88


4 Steps (QC):  88


12 Steps (QC):  88


Picking up an Object (QC):  88


Does the Pt use WC or Scooter?:  Yes


Wheel 50 feet with 2 turns (QC:  6


Type:  Manual


Wheel 150 feet:  6


Type:  Manual





PT Plan


Treatment/Plan


Treatment Plan:  Continue Plan of Care


Treatment Plan:  Bed Mobility, Education, Functional Activity Kai, Functional 

Strength, Group Therapy, Gait, Safety, Therapeutic Exercise, Transfers


Treatment Duration:  2020


Frequency:  At least 5 of 7 days/Wk (IRF)


Estimated Hrs Per Day:  1.5 hours per day


Patient and/or Family Agrees t:  Yes





Safety Risks/Education


Patient Education:  Gait Training, Transfer Techniques, Correct Positioning, W/C

Management, Disease Process, Safety Issues


Teaching Recipient:  Patient


Teaching Methods:  Demonstration, Discussion


Response to Teaching:  Verbalize Understanding, Return Demonstration, 

Reinforcement Needed





Time/GCodes


Time In:  915


Time Out:  1015


Total Billed Treatment Time:  60


Total Billed Treatment


1,GT15m,FA15m, WCH 10m,EX20m











NAY GREEN PTA             2020 10:19

## 2020-04-13 NOTE — PHYSICAL THERAPY DAILY NOTE
PT Daily Note-Current


Subjective


Pt. agrees to rx, states he feels he needs to do Nustep for warm up and attempt 

to extend right knee and supine stretching exercises.  Pt. c/o pain at 7/10 and 

wants meds after back in room as well as donning his knee ext brace.





Pain





   Numeric Pain Scale:  7


   Location:  Right


   Location Body Site:  Knee


   Pain Description:  Pressure





Mental Status


Patient Orientation:  Normal For Age


Attachments:  Other-See Comments (knee immoblizer aplied after Rx to right )





Transfers


SCALE: Activities may be completed with or without assistive devices.





6-Indepedent-patient completes the activity by him/herself with no assistance 

from a helper.


5-Set-up or Clean-up Assistance-helper sets up or cleans up; patient completes 

activity. [place] assists only prior to or  


    following the activity.


4-Supervision or Touching Assistance-helper provides verbal cues and/or 

touching/steadying and/or contact guard assistance as patient completes 

activity. Assistance may be provided   


    throughout the activity or intermittently.


3-Partial/Moderate Assistance-helper does LESS THAN HALF the effort. [place] 

lifts, holds or supports trunk or limbs, but provides less than half the effort.


2-Substantial/Maximal Assistance-helper does MORE THAN HALF the effort. [place] 

lifts or holds trunk or limbs and provides more than half the effort.


1-Fjqyycdti-faedjy does ALL the effort. Patient does none of the effort to 

complete the activity. Or, the assistance of 2 or more helpers is required for 

the patient to complete the  


    activity.


If activity was not attempted, code reason:


7-Patient Refused.


9-Not Applicable-not attempted and the patient did not perform the activity 

before the current illness, exacerbation or injury.


10-Not Attempted due to Environmental Limitations-(lack of equipment, weather 

restraints, etc.).


88-Not Attempted due to Medical Conditions or Safety Concerns.


SPTs w/c to bed, w/c to nustep. w/c to Rx mat all toward left with mod assist 

required. Pt. moves self in bed using rails and UEs with much effort





Weight Bearing


Right Lower Extremity:  Right


Weight Bearing/Tolerated





Gait Training


Gait Assistive Device:  FWW


4-5 ft with FWW min to CGA





Wheelchair Training


mod I in w/c use





Exercises


Supine Ex:  Ankle pumps (HC stretch 4 x 15 s), Quad Set, Rolling, Heel Slides 

(right knee ext assisted)


Supine Reps:  12


NuStep Minutes:  9


 NuStep Workload:  2





Treatments


prone position for right knee extension with ext measured at 12 degrees, in 

supine measures 35degrees, when pressure attempted for extension pt. has spastic

response





Assessment


Current Status:  Fair Progress





PT Short Term Goals


Short Term Goals


Time Frame:  2020


Roll Left & Right:  4


Sit to lyin


Lying to sitting on side of be:  3


Sit to stand:  3


Chair/bed-to-chair transfer:  4


Walk 10 feet:  4


Walk 50 feet with two turns:  4





PT Long Term Goals


Long Term Goals


PT Long Term Goals Time Frame:  2020


Roll Left & Right (QC):  4 (SBA)


Sit to Lying (QC):  4 (SBA)


Lying-Sitting on Side/Bed(QC):  4 (SBA)


Sit to Stand (QC):  3 (Era)


Chair/Bed-to-Chair Xfer(QC):  4 (SBA)


Toilet Transfer (QC):  3 (Era)


Car Transfer (QC):  3 (Era)


Does the Patient Walk:  Yes


Walk 10 feet (QC):  4 (SBA)


Walk 50ft with 2 Turns (QC):  4 (SBA)


Walk 150 ft (QC):  4 (SBA)


Walking 10ft on Uneven Surface:  88


1 Step (curb) (QC):  88


4 Steps (QC):  88


12 Steps (QC):  88


Picking up an Object (QC):  88


Does the Pt use WC or Scooter?:  Yes


Wheel 50 feet with 2 turns (QC:  6


Type:  Manual


Wheel 150 feet:  6


Type:  Manual





PT Plan


Treatment/Plan


Treatment Plan:  Continue Plan of Care


Treatment Plan:  Bed Mobility, Education, Functional Activity Kai, Functional 

Strength, Group Therapy, Gait, Safety, Therapeutic Exercise, Transfers


Treatment Duration:  2020


Frequency:  At least 5 of 7 days/Wk (IRF)


Estimated Hrs Per Day:  1.5 hours per day


Patient and/or Family Agrees t:  Yes





Safety Risks/Education


Patient Education:  Transfer Techniques, Correct Positioning, Disease Process, 

Safety Issues


Teaching Recipient:  Patient


Teaching Methods:  Demonstration, Discussion


Response to Teaching:  Verbalize Understanding, Return Demonstration, 

Reinforcement Needed





Time/GCodes


Time In:  1305


Time Out:  1350


Total Billed Treatment Time:  45


Total Billed Treatment


1,EX30m,FA15m











NAY GREEN PTA             2020 13:53

## 2020-04-14 VITALS — DIASTOLIC BLOOD PRESSURE: 70 MMHG | SYSTOLIC BLOOD PRESSURE: 111 MMHG

## 2020-04-14 VITALS — DIASTOLIC BLOOD PRESSURE: 58 MMHG | SYSTOLIC BLOOD PRESSURE: 99 MMHG

## 2020-04-14 RX ADMIN — BACLOFEN SCH MG: 10 TABLET ORAL at 20:20

## 2020-04-14 RX ADMIN — OXYCODONE HYDROCHLORIDE AND ACETAMINOPHEN PRN TAB: 5; 325 TABLET ORAL at 14:06

## 2020-04-14 RX ADMIN — ASPIRIN 81 MG CHEWABLE TABLET SCH MG: 81 TABLET CHEWABLE at 09:32

## 2020-04-14 RX ADMIN — APIXABAN SCH MG: 2.5 TABLET, FILM COATED ORAL at 20:20

## 2020-04-14 RX ADMIN — DOCUSATE SODIUM AND SENNOSIDES SCH EA: 8.6; 5 TABLET, FILM COATED ORAL at 20:58

## 2020-04-14 RX ADMIN — APIXABAN SCH MG: 2.5 TABLET, FILM COATED ORAL at 09:33

## 2020-04-14 RX ADMIN — POLYETHYLENE GLYCOL (3350) SCH GM: 17 POWDER, FOR SOLUTION ORAL at 09:33

## 2020-04-14 RX ADMIN — DOCUSATE SODIUM AND SENNOSIDES SCH EA: 8.6; 5 TABLET, FILM COATED ORAL at 09:33

## 2020-04-14 RX ADMIN — GABAPENTIN SCH MG: 300 CAPSULE ORAL at 20:20

## 2020-04-14 RX ADMIN — OXYCODONE HYDROCHLORIDE AND ACETAMINOPHEN PRN TAB: 5; 325 TABLET ORAL at 20:20

## 2020-04-14 RX ADMIN — BACLOFEN SCH MG: 10 TABLET ORAL at 14:02

## 2020-04-14 RX ADMIN — POLYETHYLENE GLYCOL (3350) SCH GM: 17 POWDER, FOR SOLUTION ORAL at 20:57

## 2020-04-14 RX ADMIN — BACLOFEN SCH MG: 10 TABLET ORAL at 09:33

## 2020-04-14 NOTE — PHYSICAL THERAPY DAILY NOTE
PT Daily Note-Current


Subjective


Patient in WC in room pre tx, agrees to PT, has no complaints of pain at rest.





Appearance


Patient in bed post tx with nurse call, phone, tray, all needs met.  Has right 

side knee immobilizer on and knee stretched into extension before putting it on.





Mental Status


Patient Orientation:  Normal For Age





Transfers


SCALE: Activities may be completed with or without assistive devices.





6-Indepedent-patient completes the activity by him/herself with no assistance 

from a helper.


5-Set-up or Clean-up Assistance-helper sets up or cleans up; patient completes 

activity. [place] assists only prior to or  


    following the activity.


4-Supervision or Touching Assistance-helper provides verbal cues and/or 

touching/steadying and/or contact guard assistance as patient completes 

activity. Assistance may be provided   


    throughout the activity or intermittently.


3-Partial/Moderate Assistance-helper does LESS THAN HALF the effort. [place] 

lifts, holds or supports trunk or limbs, but provides less than half the effort.


2-Substantial/Maximal Assistance-helper does MORE THAN HALF the effort. [place] 

lifts or holds trunk or limbs and provides more than half the effort.


0-Mbrphlggg-httzzh does ALL the effort. Patient does none of the effort to 

complete the activity. Or, the assistance of 2 or more helpers is required for 

the patient to complete the  


    activity.


If activity was not attempted, code reason:


7-Patient Refused.


9-Not Applicable-not attempted and the patient did not perform the activity 

before the current illness, exacerbation or injury.


10-Not Attempted due to Environmental Limitations-(lack of equipment, weather 

restraints, etc.).


88-Not Attempted due to Medical Conditions or Safety Concerns.


Roll Left & Right (QC):  4


Sit to Lying (QC):  3


Lying to Sitting/Side of Bed(Q:  3


Sit to Stand (QC):  4


Chair/Bed-to-Chair Xfer(QC):  4





Weight Bearing


Right Lower Extremity:  Right


Weight Bearing/Tolerated





Gait Training


Distance:  150'x2


Walk 10 feet (QC):  4


Walk 50 ft with 2 Turns(QC):  4


Walk 150 ft (QC):  4


Gait Persons Needed:  1


Gait Assistive Device:  FWW


CGA, no LOB but occasional moments of unsteadiness but did not need assist.  

Patient ambulates with bilateral flexed knees, narrow ANT, slow, no heel strike 

on the right side, mostly slides feet along the floor.





Exercises


Supine Ex:  Heel Slides, Short Arc Quads


Supine Reps:  10


Supine knee extension manual stretching 2min x 2.  Patient had many hamstring 

spasms and right hip pain during stretching and exercises making them very 

difficult and limiting effectiveness.





Treatments


ambulation, bed mobility and transfers, LE exercise





Assessment


Current Status:  Fair Progress


slowly improving ambulation, poor progress with knee extension





PT Short Term Goals


Short Term Goals


Time Frame:  2020


Roll Left & Right:  4


Sit to lyin


Lying to sitting on side of be:  3


Sit to stand:  3


Chair/bed-to-chair transfer:  4


Walk 10 feet:  4


Walk 50 feet with two turns:  4





PT Long Term Goals


Long Term Goals


PT Long Term Goals Time Frame:  2020


Roll Left & Right (QC):  4 (SBA)


Sit to Lying (QC):  4 (SBA)


Lying-Sitting on Side/Bed(QC):  4 (SBA)


Sit to Stand (QC):  3 (Era)


Chair/Bed-to-Chair Xfer(QC):  4 (SBA)


Toilet Transfer (QC):  3 (Era)


Car Transfer (QC):  3 (Era)


Does the Patient Walk:  Yes


Walk 10 feet (QC):  4 (SBA)


Walk 50ft with 2 Turns (QC):  4 (SBA)


Walk 150 ft (QC):  4 (SBA)


Walking 10ft on Uneven Surface:  88


1 Step (curb) (QC):  88


4 Steps (QC):  88


12 Steps (QC):  88


Picking up an Object (QC):  88


Does the Pt use WC or Scooter?:  Yes


Wheel 50 feet with 2 turns (QC:  6


Type:  Manual


Wheel 150 feet:  6


Type:  Manual





PT Plan


Problem List


Problem List:  Activity Tolerance, Functional Strength, Safety, Balance, Gait, 

Transfer, Bed Mobility, ROM





Treatment/Plan


Treatment Plan:  Continue Plan of Care


Treatment Plan:  Bed Mobility, Education, Functional Activity Kai, Functional 

Strength, Group Therapy, Gait, Safety, Therapeutic Exercise, Transfers


Treatment Duration:  2020


Frequency:  At least 5 of 7 days/Wk (IRF)


Estimated Hrs Per Day:  1.5 hours per day


Patient and/or Family Agrees t:  Yes





Safety Risks/Education


Patient Education:  Gait Training, Transfer Techniques, Correct Positioning, 

Safety Issues


Teaching Recipient:  Patient


Teaching Methods:  Demonstration, Discussion


Response to Teaching:  Reinforcement Needed





Time/GCodes


Time In:  1330


Time Out:  1400


Total Billed Treatment Time:  30


Total Billed Treatment


1 visit


GT 15'


EX 15'











NAVJOT STEVENS PT                2020 14:02

## 2020-04-14 NOTE — NUR
RD ASSESSMENT 



PMHx: GERD; chronic constipation; s/p TKA



PT INTERACTION: Pt was awake and pleasant during nutrition follow-up. Pt states he has been 
eating fairly well since last assessment. Note avg PO intake 64% x4d, per chart review. Pt 
states no issues with n/v/c/d since last assessment. Note last BM was 4/13, and pt currently 
on bowel regimen of Senna BID; and Miralax BID, per chart review. 



ABNORMAL NUTRITION-RELATED LAB VALUES

** All labs WNL at this time



Est. kcal needs: 8150-9204 kcal | 20-25 kcal/kg  

Est. Pro needs:   g Pro | 1.0-1.2 g Pro/kg 



PES STATEMENT: Inadequate oral intake (NI-2.1) related to loss of appetite as evidenced by 
pt interview | avg PO intake 64% x4d



INTERVENTION:  

Continue with current diet order of Regular diet. 

Pt may benefit from nutrition supplementation if PO intake declines. 

Will continue to follow and reassess as pt needs, intake, and status change. 



MONITOR/EVALUATE:  

PO Intake; Plan of Care; Hydration Status; Weight Status; Lab Values 





RENETTA Dugan, MS, RD, LD

## 2020-04-14 NOTE — NUR
CM/SS DISCHARGE PLANNING



Patient will be taken by his son tomorrow to his follow up assessment with Dr. Gerry Feliciano MD, [facility].  Son will be here at 0900, posted on communication board and 
updated Unit RN.



Patient has requested to discharge and return home in conjunction with this exit for his 
appointment.  Physician and team discussed and, in consideration of patient's emotional 
plea, agreed to formulate an appropriate post hospital care plan.



HHC:  Discussed agencies in patient's service area and he indicated his preferred to be 
[facility] [facility], [facility].  Referral initiated so that benefits could be authorized 
through [employer] insurance and patient could be scheduled for services.



DME:  Patient's employer staff are building him a home ramp today.  He continues to indicate 
no new DME needed, he has a FWW and a borrowed wheelchair.  He also has a 4WW with seat, 
which he has agreed not to use at this time because of safety concerns.  Because of his 
prior injuries, he has other equipment to maximize his daily functioning and independence.



Finalize all plans tomorrow.

-------------------------------------------------------------------------------

Addendum: 04/14/20 at 1457 by SUSAN FINCH 

-------------------------------------------------------------------------------

Rx:  Confirmed with [facility] that patient's Eliquis is covered, OOP is $50, overall 
price was $460.

## 2020-04-14 NOTE — OCCUPATIONAL THER DAILY NOTE
OT Current Status-Daily Note


Subjective


Pt sitting in w/c, agrees to treatment. Pt reports 5/10 pain in right knee.





ADL-Treatment


Pt maneuvered around room in w/c without assist. Sponge bath completed seated at

sink with increased time. Pt completed bathing with SBA. Doff/don shirt with set

up. Pt used sock aid to don socks with set up. Grooming tasks completed with 

modified independence. Occasional rest breaks taken during ADL tasks. Pt reports

he is having a ramp built at home and it should be completed today. States he 

has all the equipment needed for home. Pt practiced transfer to toilet to 

increase independence and safety. Transfer to toilet with SBA using grab bar. 

CGA required for transfer back to w/c secondary to w/c seat being higher than 

toilet. Pt transferred w/c<-> EOB with FWW. Sit to supine with SBA. Pt requires 

assist for trunk when returning to sitting secondary to decreased core strength.


Therapy Code Descriptions/Definitions 





Functional [place] Measure:


0=Not Assessed/NA        4=Minimal Assistance


1=Total Assistance        5=Supervision or Setup


2=Maximal Assistance  6=Modified [place]


3=Moderate Assistance 7=Complete IndependenceSCALE: Activities may be completed 

with or without assistive devices.





6-Indepedent-patient completes the activity by him/herself with no assistance 

from a helper.


5-Set-up or Clean-up Assistance-helper sets up or cleans up; patient completes 

activity. [place] assists only prior to or  


    following the activity.


4-Supervision or Touching Assistance-helper provides verbal cues and/or 

touching/steadying and/or contact guard assistance as patient completes 

activity. Assistance may be provided   


    throughout the activity or intermittently.


3-Partial/Moderate Assistance-helper does LESS THAN HALF the effort. [place] 

lifts, holds or supports trunk or limbs, but provides less than half the effort.


2-Substantial/Maximal Assistance-helper does MORE THAN HALF the effort. [place] 

lifts or holds trunk or limbs and provides more than half the effort.


8-Fmvmhvydi-ilgcnf does ALL the effort. Patient does none of the effort to 

complete the activity. Or, the assistance of 2 or more helpers is required for 

the patient to complete the  


    activity.


If activity was not attempted, code reason:


7-Patient Refused.


9-Not Applicable-not attempted and the patient did not perform the activity 

before the current illness, exacerbation or injury.


10-Not Attempted due to Environmental Limitations-(lack of equipment, weather 

restraints, etc.).


88-Not Attempted due to Medical Conditions or Safety Concerns.


Eating (QC):  6 (per report)


Upper Body Dressing (QC):  5


On/Off Footwear:  5


Toilet Transfer (QC):  4 (CGA)





Other Treatment


Pt performed w/c mobility to therapy gym without assist. Arm bike x15 minutes to

increase overall strength and activity tolerance needed for functional task 

completion. Pt performed task with mild resistance and steady pace. One rest 

break taken during task. Pt returned to room, sitting in w/c with needs met 

after session.





OT Short Term Goals


Short Term Goals


Time Frame:  2020


Eatin


Oral hygiene:  6


Toileting hygiene:  4


Shower/bathe self:  4


Upper body dressin


Lower body dressing:  3


Putting on/taking off footwear:  3





OT Long Term Goals


Long Term Goals


Time Frame:  2020


Eating (QC):  6


Oral Hygiene (QC):  6


Toileting Hygiene (QC):  6


Shower/Bathe Self (QC):  5


Upper Body Dressing (QC):  6


Lower Body Dressing (QC):  6


On/Off Footwear (QC):  6


Additional Goals:  1-Demonstrate ADL Tasks, 2-Verbalize Understanding, 

3-ImproveStrength/Kai


1=Demonstrate adherence to instructed precautions during ADL tasks.


2=Patient will verbalize/demonstrate understanding of assistive 

devices/modifications for ADL.


3=Patient will improve strength/tolerance for activity to enable patient to 

perform ADL's.





OT Education/Plan


Discharge Recommendations


Plan/Recommendations:  Continue POC





Treatment Plan/Plan of Care


Patient would benefit from OT for education, treatment and training to promote 

independence in ADL's, mobility, safety and/or upper extremity function for 

ADL's.


Plan of Care:  ADL Retraining, Functional Mobility, Group Exercise/Act as Ind, 

UE Funct Exercise/Act


Treatment Duration:  2020


Frequency:  At least 5 of 7 days/Wk (IRF)


Estimated Hrs Per Day:  1.5 hours per day


Agreement:  Yes


Rehab Potential:  Good





Time/GCodes


Start Time:  08:00


Stop Time:  09:30


Total Time Billed (hr/min):  90


Billed Treatment Time


1 visit, ADLx4(60minutes), EX(20minutes), FA(10minutes)











JOHNNY SMITH OT            2020 10:39

## 2020-04-14 NOTE — PHYSICAL THERAPY DAILY NOTE
PT Daily Note-Current


Subjective


Patient in  pre tx, agrees to PT, has no complaints of pain at rest.  Patient 

is tearful during treatment because he wants to go home very much, 

notified.





Appearance


Patient in  at desk for work post tx, instructed that when done to get back 

into bed and have his knee immobilizer put on after knee stretching.





Mental Status


Patient Orientation:  Normal For Age





Transfers


SCALE: Activities may be completed with or without assistive devices.





6-Indepedent-patient completes the activity by him/herself with no assistance 

from a helper.


5-Set-up or Clean-up Assistance-helper sets up or cleans up; patient completes 

activity. [place] assists only prior to or  


    following the activity.


4-Supervision or Touching Assistance-helper provides verbal cues and/or 

touching/steadying and/or contact guard assistance as patient completes 

activity. Assistance may be provided   


    throughout the activity or intermittently.


3-Partial/Moderate Assistance-helper does LESS THAN HALF the effort. [place] 

lifts, holds or supports trunk or limbs, but provides less than half the effort.


2-Substantial/Maximal Assistance-helper does MORE THAN HALF the effort. [place] 

lifts or holds trunk or limbs and provides more than half the effort.


7-Drufzofrd-wtgjtr does ALL the effort. Patient does none of the effort to 

complete the activity. Or, the assistance of 2 or more helpers is required for 

the patient to complete the  


    activity.


If activity was not attempted, code reason:


7-Patient Refused.


9-Not Applicable-not attempted and the patient did not perform the activity 

before the current illness, exacerbation or injury.


10-Not Attempted due to Environmental Limitations-(lack of equipment, weather 

restraints, etc.).


88-Not Attempted due to Medical Conditions or Safety Concerns.


Roll Left & Right (QC):  5


Sit to Lying (QC):  3


Lying to Sitting/Side of Bed(Q:  3


Sit to Stand (QC):  4


Chair/Bed-to-Chair Xfer(QC):  4


Car Transfer (QC):  4


Patient performs bed mobility with SBA, supine <-> sit min assist, sit <-> stand

CGA, transfers CGA, car transfer CGA.  Patient has a tendency to sit forcefully 

into chair or wheelchair.





Weight Bearing


Right Lower Extremity:  Right


Weight Bearing/Tolerated





Gait Training


Distance:  120'x2


Walk 10 feet (QC):  4


Walk 50 ft with 2 Turns(QC):  4


Walking 10ft/uneven surface-QC:  4


Gait Persons Needed:  1


Gait Assistive Device:  FWW


Patient can ambulate 120' with a rolling walker with CGA (including 50' with at 

least 2 turns of 90 degrees and 10' over an uneven surface).  Patient ambulates 

slowly, has flexed knees bilaterally, no heel strike on the right side, mostly s

lides feet across floor, narrow ANT.





Wheelchair Training


Does the Pt Use a Wheelchair?:  Yes


Wheel 50 ft with 2 turns (QC):  6


Wheel 150 ft (QC):  6


Type of Wheelchair:  Manual





Stair Training


1 Step (curb) (QC):  88


4 Steps (QC):  88


12 Steps (QC):  88





Balance


Picking up an Object (QC):  88





Exercises


Manual knee extension stretching (supine), patient was able to get to approx 10 

degrees from full extension.


NuStep Minutes:  15


 NuStep Workload:  5





Treatments


bed mobility and transfers, ambulation, LE ROM and strengthening, WC training, 

car transfer





Assessment


Current Status:  Fair Progress


improved ambulation but patient had worse hamstring spasms and increased tone, 

very hard to improve knee extension





PT Short Term Goals


Short Term Goals


Time Frame:  2020


Roll Left & Right:  4


Sit to lyin


Lying to sitting on side of be:  3


Sit to stand:  3


Chair/bed-to-chair transfer:  4


Walk 10 feet:  4


Walk 50 feet with two turns:  4





PT Long Term Goals


Long Term Goals


PT Long Term Goals Time Frame:  2020


Roll Left & Right (QC):  4 (SBA)


Sit to Lying (QC):  4 (SBA)


Lying-Sitting on Side/Bed(QC):  4 (SBA)


Sit to Stand (QC):  3 (Era)


Chair/Bed-to-Chair Xfer(QC):  4 (SBA)


Toilet Transfer (QC):  3 (Era)


Car Transfer (QC):  3 (Era)


Does the Patient Walk:  Yes


Walk 10 feet (QC):  4 (SBA)


Walk 50ft with 2 Turns (QC):  4 (SBA)


Walk 150 ft (QC):  4 (SBA)


Walking 10ft on Uneven Surface:  88


1 Step (curb) (QC):  88


4 Steps (QC):  88


12 Steps (QC):  88


Picking up an Object (QC):  88


Does the Pt use WC or Scooter?:  Yes


Wheel 50 feet with 2 turns (QC:  6


Type:  Manual


Wheel 150 feet:  6


Type:  Manual





PT Plan


Problem List


Problem List:  Activity Tolerance, Functional Strength, Safety, Balance, Gait, 

Transfer, Bed Mobility, ROM





Treatment/Plan


Treatment Plan:  Continue Plan of Care


Treatment Plan:  Bed Mobility, Education, Functional Activity Kai, Functional 

Strength, Group Therapy, Gait, Safety, Therapeutic Exercise, Transfers


Treatment Duration:  2020


Frequency:  At least 5 of 7 days/Wk (IRF)


Estimated Hrs Per Day:  1.5 hours per day


Patient and/or Family Agrees t:  Yes





Safety Risks/Education


Patient Education:  Gait Training, Transfer Techniques, Correct Positioning, W/C

Management, Safety Issues


Teaching Recipient:  Patient


Teaching Methods:  Demonstration, Discussion


Response to Teaching:  Reinforcement Needed





Time/GCodes


Time In:  1000


Time Out:  1100


Total Billed Treatment Time:  60


Total Billed Treatment


1 visit


GT 20'


EX 25'


FA 15'











NAVJOT STEVENS PT                2020 11:04

## 2020-04-14 NOTE — D/C HH FACE TO FACE ORDER
D/C  Face to Face Orders


Reconcile Patient Problems


Problems Reviewed?:  Yes





Instructions for Patient


[facility], [phone]


Patient Instructions/FollowUp:  


Dr Vogel 4/20/20 at 200pm [facility]


Physician to follow Patient:  Dr Ashley Vogel


Discharge Diet for Home:  No Restrictions


Patient Problems:  


Right knee replacement


h/o spinal cord injury


h/o DVT


Goals for Patient:  


[place]





Patient Data-Allergies,Ht & Wt


Patient Allergies:  


Coded Allergies:  


     No Known Drug Allergies (Unverified , 5/31/10)





Home Health Need/Face to Face


Date of Face to Face:  Apr 14, 2020


Clinical Findings:  Generalized weakness and fatigue, Instability, Muscle 

weakness, Pain with ambulation, Unsteady gait


I have seen Pt face-to-face:  Yes


Discharged To:  Home


Diagnosis/Conditions:  


Right knee replacement


h/o spinal cord injury


h/o DVT


Patient is Homebound due to:  Esme fall risk due to instabilty, Muscle we

akness, Pain w/ambulation


Homebound Status


   Due to the above stated illness, injury or surgical procedure (medical 

condition or diagnosis) and associated clinical findings, the patient is 

homebound because of his/her inability to leave home except with aid of a 

supportive device and/or person AND leaving the home requires a considerable and

taxing effort or is medically contraindicated.


Pt req the following assistanc:  Walker, Wheelchair





Home Health Nursing Orders


Home Health Services Order:  Occupational Ther-Evaluate & Treat, Physical 

Therapy-Evaluate & Treat


Certify Stmt


I certify that this patient is under my care and that I, a nurse practitioner or

a physician; a assistant working with me, had a face to face encounter that -

meets the physician face to face encounter requirements with this patient as 

dated.











ASHLEY VOGEL DO                Apr 14, 2020 14:33

## 2020-04-14 NOTE — PM&R PROGRESS NOTE
Subjective


HPI/CC On Admission


Date Seen by Provider:  2020


Time Seen by Provider:  10:30


Subjective/Events-last exam


Patient doing better each day and is ready for DC tomorrow before he sees Dr Feliciano at 1015 in [place]


Skyler in place and doing well and will be managed with Dr Feliciano


DVT PPX with ASA and Lovenox since h/o DVT  so I changed to Eliquis for at 

least 1 month and DC Lovenox and he was updated


No falls bur remains at risk


Percocet and Baclofen working very well for him


SSE helped resolve severe constipation


Checked meds and labs


Reviewed therapy notes


Conferred with RN





Review of Systems


General:  Fatigue


Musculoskeletal:  leg pain





Objective


Exam


Vital Signs





Vital Signs








  Date Time  Temp Pulse Resp B/P (MAP) Pulse Ox O2 Delivery O2 Flow Rate FiO2


 


4/15/20 05:14 37.1 71 18 110/69 (83) 95 Room Air  





Capillary Refill : Less Than 3 SecondsLess Than 3 Seconds


General Appearance:  No Apparent Distress, WD/WN


HEENT:  PERRL/EOMI, Normal ENT Inspection, Pharynx Normal


Neck:  Full Range of Motion, Normal Inspection, Non Tender, Supple, Carotid 

Bruit


Respiratory:  Chest Non Tender, Lungs Clear, Normal Breath Sounds, No Accessory 

Muscle Use, No Respiratory Distress


Cardiovascular:  Regular Rate, Rhythm, No Edema, No Gallop, No JVD, No Murmur, 

Normal Peripheral Pulses


Gastrointestinal:  Normal Bowel Sounds, No Organomegaly, No Pulsatile Mass, Non 

Tender, Soft


Back:  Normal Inspection, No CVA Tenderness, No Vertebral Tenderness, Decreased 

Range of Motion


Extremity:  Normal Capillary Refill, Normal Inspection, Normal Range of Motion 

(except right leg), Non Tender, No Calf Tenderness, No Pedal Edema


Neurologic/Psychiatric:  Alert, Oriented x3, Normal Mood/Affect, Motor Weakness 

(all extremities 4/5, muscle wasting upper extremities)


Skin:  Normal Color, Warm/Dry


Lymphatic:  No Adenopathy





Results/Procedures


Lab


Patient resulted labs reviewed.





FIM


Transfers


Therapy Code Descriptions/Definitions 





Functional [place] Measure:


0=Not Assessed/NA        4=Minimal Assistance


1=Total Assistance        5=Supervision or Setup


2=Maximal Assistance  6=Modified [place]


3=Moderate Assistance 7=Complete IndependenceSCALE: Activities may be completed 

with or without assistive devices.





6-Indepedent-patient completes the activity by him/herself with no assistance 

from a helper.


5-Set-up or Clean-up Assistance-helper sets up or cleans up; patient completes 

activity. [place] assists only prior to or  


    following the activity.


4-Supervision or Touching Assistance-helper provides verbal cues and/or 

touching/steadying and/or contact guard assistance as patient completes 

activity. Assistance may be provided   


    throughout the activity or intermittently.


3-Partial/Moderate Assistance-helper does LESS THAN HALF the effort. [place] 

lifts, holds or supports trunk or limbs, but provides less than half the effort.


2-Substantial/Maximal Assistance-helper does MORE THAN HALF the effort. [place] 

lifts or holds trunk or limbs and provides more than half the effort.


9-Lgcpjalek-unxcji does ALL the effort. Patient does none of the effort to 

complete the activity. Or, the assistance of 2 or more helpers is required for 

the patient to complete the  


    activity.


If activity was not attempted, code reason:


7-Patient Refused.


9-Not Applicable-not attempted and the patient did not perform the activity 

before the current illness, exacerbation or injury.


10-Not Attempted due to Environmental Limitations-(lack of equipment, weather 

restraints, etc.).


88-Not Attempted due to Medical Conditions or Safety Concerns.


Roll Left to Right (QC):  4


Sit to Lying (QC):  4


Sit to Stand (QC):  4


Chair/Bed-to-Chair Xfer(QC):  4


Car Transfer (QC):  2





Gait Training


Does the Patient Walk?:  Yes


Distance:  150 ft, 30 ft, 50 ft


Walk 10 feet (QC):  4


Walk 50 ft with 2 Turns(QC):  4


Walk 150 ft (QC):  88


Walking 10ft/uneven surface-QC:  88


Gait Persons Needed:  1


Gait Assistive Device:  FWW





Wheelchair Training


Does the Pt Use a Wheelchair?:  Yes


Distance:  100'


Wheel 50 ft with 2 turns (QC):  6


Wheel 150 ft (QC):  6


Type of Wheelchair:  Manual





Stair Training


1 Step (curb) (QC):  88


4 Steps (QC):  88


12 Steps (QC):  88





Balance


Picking up an Object (QC):  88





Mental Status/Objective


Comprehension:  7


Expression:  7


Social Interaction:  7


Problem Solvin


Memory:  7





ADL-Treatment


Eating (QC):  6


Oral Hygiene (QC):  6 (Mod I at sink at wheelchair level.)


Shower/Bathe Self (QC):  4 (SBA seated on shower chair.)


Upper Body Dressing (QC):  5


Lower Body Dressing (QC):  3 (Pt. able to don shorts over feet and up to thighs.

 Pt. lifted self in wheelchair, in wheelchair push up position, while OT donned 

shorts over hips.)


On/Off Footwear (QC):  4 (SBA to doff/don slipper socks with AE.)


Toileting Hygiene (QC):  7


Toilet Transfer (QC):  7





Assessment/Plan


Assessment and Plan


Assess & Plan/Chief Complaint


Assessment:


s/p right total knee replacement POD # 14


h/o DVT


Spinal cord injury hx C4-C6 


Muscle spasms severe and incapacitating acute on chronic


Neurogenic bladder


Colon atony


GERD


Acute hyponatremia


DVT PPx with ASA and Lovenox





Plan:


Lovenox transitioned to OAC for DVT PPx


Wheelchair mobility along with walking as much as possible


Pain control


IRF protocol


Bowel and bladder management


Ortho appt upon DC





(1) Status post right knee replacement


(2) History of spinal cord injury


(3) Muscle spasm


(4) Neurogenic bladder


(5) Colon atonic


(6) Muscle wasting


(7) Hyponatremia


(8) History of DVT (deep vein thrombosis)


(9) DVT prophylaxis


(10) Chronic GERD











EYAL NIELSEN DO                2020 10:22

## 2020-04-15 VITALS — DIASTOLIC BLOOD PRESSURE: 69 MMHG | SYSTOLIC BLOOD PRESSURE: 110 MMHG

## 2020-04-15 RX ADMIN — OXYCODONE HYDROCHLORIDE AND ACETAMINOPHEN PRN TAB: 5; 325 TABLET ORAL at 08:04

## 2020-04-15 RX ADMIN — APIXABAN SCH MG: 2.5 TABLET, FILM COATED ORAL at 08:04

## 2020-04-15 RX ADMIN — POLYETHYLENE GLYCOL (3350) SCH GM: 17 POWDER, FOR SOLUTION ORAL at 08:04

## 2020-04-15 RX ADMIN — ASPIRIN 81 MG CHEWABLE TABLET SCH MG: 81 TABLET CHEWABLE at 08:04

## 2020-04-15 RX ADMIN — BACLOFEN SCH MG: 10 TABLET ORAL at 08:04

## 2020-04-15 RX ADMIN — DOCUSATE SODIUM AND SENNOSIDES SCH EA: 8.6; 5 TABLET, FILM COATED ORAL at 08:04

## 2020-04-15 NOTE — NUR
CM/SS DISCHARGE



HHC:  Finalized as planned with [facility] for PT/OT services.



Patient left with his son this a.m. around 0900 for his ortho surgeon follow up.  He was 
discharged as earlier noted and will return home after.



Patient self-directed ramp building through his place of employment.



His employer IT person came with permission last afternoon to retrieve his equipment, 
computer, printer, etc.  Staff were alerted of the special permission and process for 
whatever escort they need to participate in.



Unit RN was apprised of discharge early a.m. due to departure scheduled for 0900.

## 2020-04-15 NOTE — DISCHARGE SUMMARY
Diagnosis/Chief Complaint


Date of Admission


2020 at 16:51


Date of Discharge


Apr 15, 2020 at 09:15


Discharge Date:  Apr 15, 2020


Discharge Diagnosis


Assessment:


s/p right total knee replacement POD # 16


h/o DVT


Spinal cord injury hx C4-C6 


Muscle spasms severe and incapacitating acute on chronic


Neurogenic bladder


Colon atony


GERD


Acute hyponatremia


DVT PPx with ASA and Lovenox





Plan:


Lovenox transitioned to OAC for DVT PPx


Wheelchair mobility along with walking as much as possible


Pain control


IRF protocol


Bowel and bladder management


Ortho appt after DC





(1) Status post right knee replacement


(2) History of spinal cord injury


(3) Muscle spasm


(4) Neurogenic bladder


(5) Colon atonic


(6) Muscle wasting


(7) Hyponatremia


(8) History of DVT (deep vein thrombosis)


(9) DVT prophylaxis


(10) Chronic GERD





Discharge Summary


Discharge Physical Examination


Allergies:  


Coded Allergies:  


     No Known Drug Allergies (Unverified , 5/31/10)


Vitals & I&Os





Vital Signs








  Date Time  Temp Pulse Resp B/P (MAP) Pulse Ox O2 Delivery O2 Flow Rate FiO2


 


4/15/20 05:14 37.1 71 18 110/69 (83) 95 Room Air  








General Appearance:  Alert, Oriented X3, Cooperative


Respiratory:  Clear to Auscultation


Cardiovascular:  Regular Rate


Neuro:  Normal Speech, Strength at 5/5 X4 Ext


Psych/Mental Status:  Mental Status NL





Hospital Course


Was the Problem List Reviewed?:  Yes


Patient had a very productive rehab course for 9 days after right TKQ by Dr Feliciano and had slow recovery due to severe residual deficits following spinal 

cord injury in . Patient remained on Lovenox that transitioned to OAC at DC 

due to h/o DVT in  and since he was so mobility restricted he was considered

high risk. Pain control maintained with Baclofen and Percocet. BM regimen was 

maintained and SSE initiated for full evacuation due to h/o colon atony from spi

nal cord injury hx. Overall he learned improved techniques from therapy and was 

able to DC home in improved condition with therapy HH.


Labs (last 24 hrs)


Laboratory Tests


20 05:20: 


White Blood Count 9.3, Red Blood Count 3.35L, Hemoglobin 11.0L, Hematocrit 33L, 

Mean Corpuscular Volume 99, Mean Corpuscular Hemoglobin 33, Mean Corpuscular 

Hemoglobin Concent 33, Red Cell Distribution Width 12.5, Platelet Count 282, 

Mean Platelet Volume 9.8, Neutrophils (%) (Auto) 74, Lymphocytes (%) (Auto) 14, 

Monocytes (%) (Auto) 12, Eosinophils (%) (Auto) 1, Basophils (%) (Auto) 0, 

Neutrophils # (Auto) 6.9, Lymphocytes # (Auto) 1.3, Monocytes # (Auto) 1.1H, 

Eosinophils # (Auto) 0.1, Basophils # (Auto) 0.0, Sodium Level 135, Potassium 

Level 4.5, Chloride Level 100, Carbon Dioxide Level 24, Anion Gap 11, Blood Urea

Nitrogen 16, Creatinine 0.76, Estimat Glomerular Filtration Rate > 60, 

BUN/Creatinine Ratio 21, Glucose Level 97, Calcium Level 9.1, Corrected Calcium 

9.7, Iron Level 42, Total Bilirubin 0.5, Aspartate Amino Transf (AST/SGOT) 27, 

Alanine Aminotransferase (ALT/SGPT) 33, Alkaline Phosphatase 43, Total Protein 

6.6, Albumin 3.3


20 06:10: 


White Blood Count 9.0, Red Blood Count 3.58L, Hemoglobin 11.6L, Hematocrit 35L, 

Mean Corpuscular Volume 99, Mean Corpuscular Hemoglobin 32, Mean Corpuscular 

Hemoglobin Concent 33, Red Cell Distribution Width 12.8, Platelet Count 442H, 

Mean Platelet Volume 9.6, Neutrophils (%) (Auto) 75, Lymphocytes (%) (Auto) 17, 

Monocytes (%) (Auto) 7, Eosinophils (%) (Auto) 1, Basophils (%) (Auto) 0, 

Neutrophils # (Auto) 6.7, Lymphocytes # (Auto) 1.5, Monocytes # (Auto) 0.7, 

Eosinophils # (Auto) 0.1, Basophils # (Auto) 0.0, Sodium Level 138, Potassium 

Level 4.4, Chloride Level 103, Carbon Dioxide Level 25, Anion Gap 10, Blood Urea

Nitrogen 13, Creatinine 0.76, Estimat Glomerular Filtration Rate > 60, 

BUN/Creatinine Ratio 17, Glucose Level 82, Calcium Level 9.3, Corrected Calcium 

9.9, Total Bilirubin 0.3, Aspartate Amino Transf (AST/SGOT) 28, Alanine 

Aminotransferase (ALT/SGPT) 32, Alkaline Phosphatase 65, Total Protein 6.7, 

Albumin 3.3





Pending Labs


Laboratory Tests


20 05:20: 


White Blood Count 9.3, Red Blood Count 3.35, Hemoglobin 11.0, Hematocrit 33, 

Mean Corpuscular Volume 99, Mean Corpuscular Hemoglobin 33, Mean Corpuscular 

Hemoglobin Concent 33, Red Cell Distribution Width 12.5, Platelet Count 282, 

Mean Platelet Volume 9.8, Neutrophils (%) (Auto) 74, Lymphocytes (%) (Auto) 14, 

Monocytes (%) (Auto) 12, Eosinophils (%) (Auto) 1, Basophils (%) (Auto) 0, 

Neutrophils # (Auto) 6.9, Lymphocytes # (Auto) 1.3, Monocytes # (Auto) 1.1, 

Eosinophils # (Auto) 0.1, Basophils # (Auto) 0.0, Sodium Level 135, Potassium 

Level 4.5, Chloride Level 100, Carbon Dioxide Level 24, Anion Gap 11, Blood Urea

Nitrogen 16, Creatinine 0.76, Estimat Glomerular Filtration Rate > 60, 

BUN/Creatinine Ratio 21, Glucose Level 97, Calcium Level 9.1, Corrected Calcium 

9.7, Iron Level 42, Total Bilirubin 0.5, Aspartate Amino Transf (AST/SGOT) 27, 

Alanine Aminotransferase (ALT/SGPT) 33, Alkaline Phosphatase 43, Total Protein 

6.6, Albumin 3.3


20 06:10: 


White Blood Count 9.0, Red Blood Count 3.58, Hemoglobin 11.6, Hematocrit 35, 

Mean Corpuscular Volume 99, Mean Corpuscular Hemoglobin 32, Mean Corpuscular 

Hemoglobin Concent 33, Red Cell Distribution Width 12.8, Platelet Count 442, 

Mean Platelet Volume 9.6, Neutrophils (%) (Auto) 75, Lymphocytes (%) (Auto) 17, 

Monocytes (%) (Auto) 7, Eosinophils (%) (Auto) 1, Basophils (%) (Auto) 0, 

Neutrophils # (Auto) 6.7, Lymphocytes # (Auto) 1.5, Monocytes # (Auto) 0.7, 

Eosinophils # (Auto) 0.1, Basophils # (Auto) 0.0, Sodium Level 138, Potassium 

Level 4.4, Chloride Level 103, Carbon Dioxide Level 25, Anion Gap 10, Blood Urea

Nitrogen 13, Creatinine 0.76, Estimat Glomerular Filtration Rate > 60, 

BUN/Creatinine Ratio 17, Glucose Level 82, Calcium Level 9.3, Corrected Calcium 

9.9, Total Bilirubin 0.3, Aspartate Amino Transf (AST/SGOT) 28, Alanine 

Aminotransferase (ALT/SGPT) 32, Alkaline Phosphatase 65, Total Protein 6.7, 

Albumin 3.3








Discharge


Home Medications:





Active Scripts


Active


Polyethylene Glycol 3350 17 Gm Powd.pack 17 Gm PO BID


Dok (Docusate Sodium) 100 Mg Capsule 100 Mg PO BID PRN


Bisacodyl 10 Mg Supp.rect 10 Mg VT DAILY PRN


Lactulose 20 Gm/30 Ml Solution 10 Gm PO BID PRN


Gabapentin 300 Mg Capsule 300 Mg PO HS


Percocet 5-325 mg Tablet (Oxycodone HCl/Acetaminophen) 1 Each Tablet 2 Tab PO 

Q4H PRN


Eliquis (Apixaban) 2.5 Mg Tablet 2.5 Mg PO BID


Baclofen 10 Mg Tablet 10 Mg PO TID


Reported


Meloxicam 15 Mg Tablet 15 Mg PO DAILY PRN





Instructions to patient/family


Please see electronic discharge instructions given to patient.





Diagnosis/Problems


Diagnosis/Problems





(1) Status post right knee replacement


(2) History of spinal cord injury


(3) Muscle spasm


(4) Neurogenic bladder


(5) Colon atonic


(6) Muscle wasting


(7) Hyponatremia


(8) History of DVT (deep vein thrombosis)


(9) DVT prophylaxis


(10) Chronic GERD





Clinical Quality Measures


DVT/VTE Risk/Contraindication:


Risk Factor Score Per Nursin


RFS Level Per Nursing on Admit:  4+=Very High











EYAL NIELSEN DO                Apr 15, 2020 10:41

## 2020-04-15 NOTE — THERAPY TEAM DISCHARGE SUMMARY
Therapy Discharge Summary


Discharge Recommendations


Date of Discharge


Apr 15, 2020 at 09:15





Physical Therapy


This patient was admitted to ARU from Acute hospital post eleective right TKR.  

Prior to surgery, he was mod indep with functional mobility and had his home and

environment set up satisfactory for functional mobility.  At admission to ARU, 

he was min assist with bed mobility, max assist to transfer to a stand and 

walked 30 ft with FWW with min assist.  Treatment has focused on post TKR 

protocol with strength and ROM progression as well as functional mobility yaquelin carlos.  At discharge, he was supervision with bed mobility for rolling and 

scooting but min assist in/out of bed; he was SBA with sit to stand.  He was 

able to ambulate 120 ft with FWW with SB-CGA.  His gait pattern was narrow ANT 

with intermittent scissoring, flexed knee right (leg length discrepancy and 

increased NM tone) with decreased heel strike/toe off and foot clearance.  He 

has met all goals to a satisfactory level and pt is anxious to return home this 

date, noting he feels he can manage at home.  He does plan to have [facility] PT to 

follow.  Will DC from ARU this date.





Occupational Therapy


Decreased Activ Tolerance, Impaired I ADL's, Impaired Self-Care Skills





PT Long Term Goals


Long Term Goals


PT Long Term Goals Time Frame:  Apr 28, 2020


Roll Left to Right (QC):  4 (SBA)


Sit to Lying (QC):  4 (SBA)


Lying-Sitting on Side/Bed(QC):  4 (SBA)


Sit to Stand (QC):  3 (Era)


Chair/Bed-to-Chair Xfer(QC):  4 (SBA)


Car Transfer (QC):  3 (Era)


Does the Patient Walk:  Yes


Walk 10 feet (QC):  4 (SBA)


Walk 10ft-Uneven Surface(QC):  88


Walk 50ft with 2 Turns (QC):  4 (SBA)


Walk 150 ft (QC):  4 (SBA)


Does the Pt use WC or Scooter?:  Yes


Wheel 50 feet with 2 turns (QC:  6 (met)


1 Step (curb) (QC):  88


4 Steps (QC):  88


12 Steps (QC):  88


Picking up an Object (QC):  88


Goals met to a satisfactory level.





OT Long Term Goals


Long Term Goals


Time Frame:  Apr 21, 2020


Eating (QC):  6


Oral Hygiene (QC):  6


Shower/Bathe Self (QC):  5


Upper Body Dressing (QC):  6


Lower Body Dressing (QC):  6


On/Off Footwear (QC):  6


Toileting Hygiene (QC):  6


Toilet/Commode Transfer (QC):  3 (Era)


Additional Goals:  1-Demonstrate ADL Tasks, 2-Verbalize Understanding, 3-

ImproveStrength/Kai


1=Demonstrate adherence to instructed precautions during ADL tasks.


2=Patient will verbalize/demonstrate understanding of assistive 

devices/modifications for ADL.


3=Patient will improve strength/tolerance for activity to enable patient to 

perform ADL's.











MALKA MONTANO PT             Apr 15, 2020 14:17

## 2020-04-16 NOTE — THERAPY TEAM DISCHARGE SUMMARY
Therapy Discharge Summary


Discharge Recommendations


Date of Discharge


Apr 15, 2020 at 09:15


Therapy D/C Recommendations:  Home w/ Family Support





Occupational Therapy


Pt. has been seen by occupational therapy to increase overall strength and 

independence.  Pt. has made great progress, but continues to requires SBA/Set up

in this environment, as opposed to his own.  Pt. has worked on functional 

transfers, and different strategies for safe mobility with new knee replacement,

and decreased mobility.  Pt. is able to transfer with SBA/Min assist, depending 

on the surface he is transferring to.  Pt. has had ramp built at front entrance,

and has all other needed equipment at home.  Pt. discharged home with spouse and

grown children support.  All needs met.


Decreased Activ Tolerance, Impaired I ADL's, Impaired Self-Care Skills





PT Long Term Goals


Long Term Goals


PT Long Term Goals Time Frame:  Apr 28, 2020


Roll Left to Right (QC):  4 (SBA)


Sit to Lying (QC):  4 (SBA)


Lying-Sitting on Side/Bed(QC):  4 (SBA)


Sit to Stand (QC):  3 (Era)


Chair/Bed-to-Chair Xfer(QC):  4 (SBA)


Car Transfer (QC):  3 (Era)


Does the Patient Walk:  Yes


Walk 10 feet (QC):  4 (SBA)


Walk 10ft-Uneven Surface(QC):  88


Walk 50ft with 2 Turns (QC):  4 (SBA)


Walk 150 ft (QC):  4 (SBA)


Does the Pt use WC or Scooter?:  Yes


Wheel 50 feet with 2 turns (QC:  6 (met)


1 Step (curb) (QC):  88


4 Steps (QC):  88


12 Steps (QC):  88


Picking up an Object (QC):  88





OT Long Term Goals


Long Term Goals


Time Frame:  Apr 21, 2020


Eating (QC):  6 (met)


Oral Hygiene (QC):  6 (met)


Shower/Bathe Self (QC):  5 (not met- SBA)


Upper Body Dressing (QC):  6 (not met)


Lower Body Dressing (QC):  6 (not met)


On/Off Footwear (QC):  6 (not met)


Toileting Hygiene (QC):  6 (not met)


Toilet/Commode Transfer (QC):  3 (Era)


Additional Goals:  1-Demonstrate ADL Tasks, 2-Verbalize Understanding, 3-

ImproveStrength/Kai


1=Demonstrate adherence to instructed precautions during ADL tasks.


2=Patient will verbalize/demonstrate understanding of assistive 

devices/modifications for ADL.


3=Patient will improve strength/tolerance for activity to enable patient to 

perform ADL's.











LILIANA SIMPSON OT           Apr 16, 2020 11:39

## 2022-12-20 ENCOUNTER — HOSPITAL ENCOUNTER (OUTPATIENT)
Dept: HOSPITAL 75 - PREOP | Age: 62
LOS: 1 days | End: 2022-12-21
Attending: SURGERY
Payer: COMMERCIAL

## 2022-12-20 VITALS — WEIGHT: 200.4 LBS | BODY MASS INDEX: 32.21 KG/M2 | HEIGHT: 66.14 IN

## 2022-12-20 DIAGNOSIS — Z01.818: Primary | ICD-10-CM

## 2022-12-20 DIAGNOSIS — Z12.11: ICD-10-CM

## 2022-12-27 ENCOUNTER — HOSPITAL ENCOUNTER (OUTPATIENT)
Dept: HOSPITAL 75 - ENDO | Age: 62
Discharge: HOME | End: 2022-12-27
Attending: SURGERY
Payer: COMMERCIAL

## 2022-12-27 VITALS — BODY MASS INDEX: 32.21 KG/M2 | WEIGHT: 200.4 LBS | HEIGHT: 66.14 IN

## 2022-12-27 VITALS — SYSTOLIC BLOOD PRESSURE: 127 MMHG | DIASTOLIC BLOOD PRESSURE: 60 MMHG

## 2022-12-27 VITALS — SYSTOLIC BLOOD PRESSURE: 129 MMHG | DIASTOLIC BLOOD PRESSURE: 74 MMHG

## 2022-12-27 VITALS — DIASTOLIC BLOOD PRESSURE: 100 MMHG | SYSTOLIC BLOOD PRESSURE: 132 MMHG

## 2022-12-27 VITALS — SYSTOLIC BLOOD PRESSURE: 115 MMHG | DIASTOLIC BLOOD PRESSURE: 64 MMHG

## 2022-12-27 VITALS — DIASTOLIC BLOOD PRESSURE: 74 MMHG | SYSTOLIC BLOOD PRESSURE: 129 MMHG

## 2022-12-27 DIAGNOSIS — E66.9: ICD-10-CM

## 2022-12-27 DIAGNOSIS — Z12.11: Primary | ICD-10-CM

## 2022-12-27 DIAGNOSIS — K29.70: ICD-10-CM

## 2022-12-27 DIAGNOSIS — K63.5: ICD-10-CM

## 2022-12-27 DIAGNOSIS — D12.8: ICD-10-CM

## 2022-12-27 DIAGNOSIS — D64.9: ICD-10-CM

## 2022-12-27 DIAGNOSIS — D12.3: ICD-10-CM

## 2022-12-27 PROCEDURE — 88305 TISSUE EXAM BY PATHOLOGIST: CPT

## 2022-12-27 NOTE — PROGRESS NOTE-PRE OPERATIVE
Pre-Operative Progress Note


Date of Available H&P:  Nov 28, 2022


Date H&P Reviewed:  Dec 27, 2022


Time H&P Reviewed:  11:41


History & Physical:  H&P Reviewed, Patient Examed, No changes noted


Pre-Operative Diagnosis:  Epigastric pain, screening colonoscopy











TAISHA FUENTES DO              Dec 27, 2022 11:43

## 2022-12-27 NOTE — ANESTHESIA-GENERAL POST-OP
MAC


Patient Condition


Mental Status/LOC:  Same as Preop


Cardiovascular:  Satisfactory


Nausea/Vomiting:  Absent


Respiratory:  Satisfactory


Pain:  Controlled


Complications:  Absent





Post Op Complications


Complications


None





Follow Up Care/Instructions


Patient Instructions


None needed.





Anesthesiology Discharge Order


Discharge Order


Patient is doing well, no complaints, stable vital signs, no apparent adverse 

anesthesia problems.   


No complications reported per nursing.











AURORA REINA CRNA         Dec 27, 2022 12:54

## 2022-12-27 NOTE — DISCHARGE INST-SIMPLE/STANDARD
Discharge Inst-Standard


Patient Instructions/Follow Up


Plan of Care/Instructions/FU:  


Please follow-up with Dr. Velasquez in 2 weeks in outpatient clinic


Activity as Tolerated:  Yes


Discharge Diet:  Regular Diet











TAISHA VELASQUEZ DO              Dec 27, 2022 12:49

## 2022-12-27 NOTE — OPERATIVE REPORT
DATE OF SERVICE: 12/27/2022



PREOPERATIVE DIAGNOSES:

Epigastric abdominal pain, screening colonoscopy.



POSTOPERATIVE DIAGNOSES:

Gastritis, colon polyps.



PROCEDURES:

EGD with biopsy, colonoscopy with hot biopsy polypectomy x3.



SURGEON:

Taisha Velasquez DO.



ANESTHESIA:

Per CRNA.



ESTIMATED BLOOD LOSS:

None.



COMPLICATIONS:

None.



INDICATIONS:

The patient is a 62-year-old male with epigastric abdominal pain and anemia, 

screening colonoscopy.  He understands risks and benefits of procedure and 

wished to proceed.  Consent was signed in chart.



DESCRIPTION OF PROCEDURE:

The patient was taken to the endoscopy suite, placed in left lateral recumbent 

position.  Timeout was performed.  Scope was inserted in the mouth, down the 

esophagus, stomach and duodenum without difficulty.  There were no polyps, 

masses, ulcerations in the duodenum.  Scope was slowly retracted back to stomach

where it was further insufflated.  Gastritis was apparent throughout the body 

and antrum of the stomach.  Biopsy of the antrum and body were obtained.  Scope 

was retroflexed, noting no other pathology.  Scope was returned to its normal 

position, slowly withdrawn in the distal esophagus.  Biopsy of GE junction was 

obtained.  No polyps, masses or ulcerations.  Scope was then slowly retracted 

back until completely removed, noting no other pathology.  Digital rectal exam 

was performed.  No palpable pulses, masses or ulcerations.  Scope was inserted 

in the rectum and advanced all the way to the cecum with minimal difficulty.  

Prep was adequate.  Scope was then slowly retracted back.  No polyps, masses, 

ulcerations within the cecum, ascending colon.  In the transverse colon, a polyp

was present, which hot biopsy polypectomy was performed.  Scope was continuously

retracted back through the descending colon where another small polyp was 

present, which hot biopsy polypectomy was performed.  Scope was continuously 

retracted back.  There were no polyps, mass, ulcerations in the sigmoid colon.  

Once in the rectum, a small polyp was present, which hot biopsy polypectomy was 

performed.  Scope was retroflexed, noting no other pathology.  Scope was 

returned to its normal position, slowly withdrawn until completely removed.  The

patient tolerated the procedure well without any complications, taken to 

recovery room in stable condition.



RECOMMENDATIONS:

The patient will need repeat colonoscopy in 5 years.  Any issues before that be 

seen at that time.  The patient will be started on Protonix 40 mg daily and see 

if this helps his symptoms.  We will also consider discontinuing the meloxicam. 

The patient will follow up in 2 weeks with pathology results.











Job ID: [id number]

DocumentID: [id number]

Dictated Date: 12/27/2022 12:52:22

Transcription Date: 12/27/2022 22:22:00

Dictated By: TAISHA VELASQUEZ DO